# Patient Record
Sex: FEMALE | Race: OTHER | HISPANIC OR LATINO
[De-identification: names, ages, dates, MRNs, and addresses within clinical notes are randomized per-mention and may not be internally consistent; named-entity substitution may affect disease eponyms.]

---

## 2020-07-09 PROBLEM — Z00.00 ENCOUNTER FOR PREVENTIVE HEALTH EXAMINATION: Status: ACTIVE | Noted: 2020-07-09

## 2020-07-13 ENCOUNTER — APPOINTMENT (OUTPATIENT)
Dept: DERMATOLOGY | Facility: CLINIC | Age: 55
End: 2020-07-13
Payer: COMMERCIAL

## 2020-07-13 VITALS — WEIGHT: 200 LBS | HEIGHT: 65 IN | BODY MASS INDEX: 33.32 KG/M2

## 2020-07-13 DIAGNOSIS — Z91.89 OTHER SPECIFIED PERSONAL RISK FACTORS, NOT ELSEWHERE CLASSIFIED: ICD-10-CM

## 2020-07-13 DIAGNOSIS — Z78.9 OTHER SPECIFIED HEALTH STATUS: ICD-10-CM

## 2020-07-13 DIAGNOSIS — B07.8 OTHER VIRAL WARTS: ICD-10-CM

## 2020-07-13 DIAGNOSIS — R20.8 OTHER DISTURBANCES OF SKIN SENSATION: ICD-10-CM

## 2020-07-13 PROCEDURE — 17110 DESTRUCTION B9 LES UP TO 14: CPT

## 2020-07-13 NOTE — PHYSICAL EXAM
[Alert] : alert [Oriented x 3] : ~L oriented x 3 [Well Nourished] : well nourished [FreeTextEntry3] : Type III skin\par \par Patient wearing a facemask\par Dorsum right fifth finger at the medial PIP joint:  10 x 9 mm thick pink verrucous plaque

## 2020-07-13 NOTE — HISTORY OF PRESENT ILLNESS
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  Self treated with "wart remover" without improvement. [FreeTextEntry1] : Wart on right fifth finger

## 2020-08-12 ENCOUNTER — APPOINTMENT (OUTPATIENT)
Dept: DERMATOLOGY | Facility: CLINIC | Age: 55
End: 2020-08-12

## 2020-08-26 ENCOUNTER — LABORATORY RESULT (OUTPATIENT)
Age: 55
End: 2020-08-26

## 2020-08-26 ENCOUNTER — APPOINTMENT (OUTPATIENT)
Dept: RHEUMATOLOGY | Facility: CLINIC | Age: 55
End: 2020-08-26
Payer: COMMERCIAL

## 2020-08-26 DIAGNOSIS — Z78.9 OTHER SPECIFIED HEALTH STATUS: ICD-10-CM

## 2020-08-26 PROCEDURE — 99243 OFF/OP CNSLTJ NEW/EST LOW 30: CPT

## 2020-08-28 PROBLEM — Z78.9 NEVER SMOKED TOBACCO: Status: ACTIVE | Noted: 2020-08-28

## 2020-08-28 NOTE — HISTORY OF PRESENT ILLNESS
[FreeTextEntry1] : 55 year old female with PMH as listed below presents today for an initial evaluation. \par \par Reports to have chronic hx of polyarthralgias, myalgias, fatigue that is now getting progressively worse. Denies swelling to the joints. Pain is 6/10- dull/aching sensation- constant. Worse with activity and at the end of the day. Taking gabapentin 300mg at bedtime, Tylenol/NSAIDs with some improvement in symptoms. \par \par Has hx of chronic LBP and  planned for laminectomy, microdiscectomy this week.\par denies inflammatory back pain,enthesitis, uveitis,dactylitis, psoriasis/rashes, denies fever, chills

## 2020-08-28 NOTE — ASSESSMENT
[FreeTextEntry1] : 55 year old female presents today for an initial evaluation. Reports to have chronic hx of polyarthralgias, myalgias, fatigue that is now getting progressively worse. Denies swelling to the joints. Taking gabapentin 300mg at bedtime, Tylenol/NSAIDs with some improvement in symptoms. Will do further w/o as below. ROS and PE otherwise unremarkable for underlying classic CTD/ systemic autoimmune disease. Symptoms likely from degenerative disease. Likely also has a component of fibromyalgia. \par \par - labs as below\par -encouraged proper diet, good sleep hygiene, exercise, weight loss\par -consider trail with muscle relaxer, +/- duloxetine\par -NSAIDS/Tylenol prn for pain (reviewed risk and benefits of medications)\par -OTC topical analgesics\par \par Discussed treatment plan with the patient. The patient was given the opportunity to ask questions and all questions were answered to their satisfaction.

## 2020-08-28 NOTE — PHYSICAL EXAM
[General Appearance - Alert] : alert [General Appearance - In No Acute Distress] : in no acute distress [General Appearance - Well Nourished] : well nourished [Sclera] : the sclera and conjunctiva were normal [General Appearance - Well Developed] : well developed [PERRL With Normal Accommodation] : pupils were equal in size, round, and reactive to light [Extraocular Movements] : extraocular movements were intact [Examination Of The Oral Cavity] : the lips and gums were normal [Outer Ear] : the ears and nose were normal in appearance [Oropharynx] : the oropharynx was normal [Neck Appearance] : the appearance of the neck was normal [Respiration, Rhythm And Depth] : normal respiratory rhythm and effort [Jugular Venous Distention Increased] : there was no jugular-venous distention [Heart Rate And Rhythm] : heart rate was normal and rhythm regular [Heart Sounds] : normal S1 and S2 [Auscultation Breath Sounds / Voice Sounds] : lungs were clear to auscultation bilaterally [Murmurs] : no murmurs [Bowel Sounds] : normal bowel sounds [Heart Sounds Pericardial Friction Rub] : no pericardial rub [Heart Sounds Gallop] : no gallops [Abdomen Tenderness] : non-tender [Abdomen Soft] : soft [No Spinal Tenderness] : no spinal tenderness [Abnormal Walk] : normal gait [No CVA Tenderness] : no ~M costovertebral angle tenderness [Involuntary Movements] : no involuntary movements were seen [Musculoskeletal - Swelling] : no joint swelling seen [Nail Clubbing] : no clubbing  or cyanosis of the fingernails [Skin Turgor] : normal skin turgor [Skin Color & Pigmentation] : normal skin color and pigmentation [Motor Tone] : muscle strength and tone were normal [] : no rash [Skin Lesions] : no skin lesions [Deep Tendon Reflexes (DTR)] : deep tendon reflexes were 2+ and symmetric [Sensation] : the sensory exam was normal to light touch and pinprick [No Focal Deficits] : no focal deficits [Motor Exam] : the motor exam was normal [Impaired Insight] : insight and judgment were intact [Oriented To Time, Place, And Person] : oriented to person, place, and time [Affect] : the affect was normal [Mood] : the mood was normal [FreeTextEntry1] : + diffuse soft tissue tenderness

## 2020-09-01 LAB
25(OH)D3 SERPL-MCNC: 18 NG/ML
ALBUMIN MFR SERPL ELPH: 59 %
ALBUMIN SERPL ELPH-MCNC: 4.5 G/DL
ALBUMIN SERPL-MCNC: 4.1 G/DL
ALBUMIN/GLOB SERPL: 1.4 RATIO
ALBUPE: 17.6 %
ALDOLASE SERPL-CCNC: 5.4 U/L
ALP BLD-CCNC: 82 U/L
ALPHA1 GLOB MFR SERPL ELPH: 3.7 %
ALPHA1 GLOB SERPL ELPH-MCNC: 0.3 G/DL
ALPHA1UPE: 35.3 %
ALPHA2 GLOB MFR SERPL ELPH: 9.6 %
ALPHA2 GLOB SERPL ELPH-MCNC: 0.7 G/DL
ALPHA2UPE: 21.2 %
ALT SERPL-CCNC: 40 U/L
ANA PAT FLD IF-IMP: ABNORMAL
ANA SER IF-ACNC: ABNORMAL
ANION GAP SERPL CALC-SCNC: 13 MMOL/L
AST SERPL-CCNC: 26 U/L
B-GLOBULIN MFR SERPL ELPH: 12.6 %
B-GLOBULIN SERPL ELPH-MCNC: 0.9 G/DL
BASOPHILS # BLD AUTO: 0.02 K/UL
BASOPHILS NFR BLD AUTO: 0.3 %
BETAUPE: 15.6 %
BILIRUB SERPL-MCNC: 0.4 MG/DL
BUN SERPL-MCNC: 17 MG/DL
C3 SERPL-MCNC: 135 MG/DL
C4 SERPL-MCNC: 32 MG/DL
CALCIUM SERPL-MCNC: 9.7 MG/DL
CCP AB SER IA-ACNC: <8 UNITS
CHLORIDE SERPL-SCNC: 105 MMOL/L
CK SERPL-CCNC: 91 U/L
CO2 SERPL-SCNC: 24 MMOL/L
CREAT 24H UR-MCNC: NORMAL G/24 H
CREAT SERPL-MCNC: 0.66 MG/DL
CREAT SPEC-SCNC: 122 MG/DL
CREAT/PROT UR: 0.1 RATIO
CREATININE UR (MAYO): 121 MG/DL
CRP SERPL-MCNC: 0.25 MG/DL
DSDNA AB SER-ACNC: <12 IU/ML
ENA JO1 AB SER IA-ACNC: <0.2 AL
ENA RNP AB SER IA-ACNC: <0.2 AL
ENA RNP AB SER IA-ACNC: <0.2 AL
ENA SCL70 IGG SER IA-ACNC: <0.2 AL
ENA SM AB SER IA-ACNC: <0.2 AL
ENA SS-A AB SER IA-ACNC: <0.2 AL
ENA SS-B AB SER IA-ACNC: <0.2 AL
EOSINOPHIL # BLD AUTO: 0.16 K/UL
EOSINOPHIL NFR BLD AUTO: 2.6 %
ERYTHROCYTE [SEDIMENTATION RATE] IN BLOOD BY WESTERGREN METHOD: 20 MM/HR
GAMMA GLOB FLD ELPH-MCNC: 1.1 G/DL
GAMMA GLOB MFR SERPL ELPH: 15.1 %
GAMMAUPE: 10.3 %
GLUCOSE SERPL-MCNC: 99 MG/DL
HCT VFR BLD CALC: 44 %
HGB BLD-MCNC: 14 G/DL
IGA 24H UR QL IFE: NORMAL
IMM GRANULOCYTES NFR BLD AUTO: 0.2 %
INTERPRETATION SERPL IEP-IMP: NORMAL
KAPPA LC 24H UR QL: NORMAL
LYMPHOCYTES # BLD AUTO: 2.58 K/UL
LYMPHOCYTES NFR BLD AUTO: 42.6 %
M PROTEIN SPEC IFE-MCNC: NORMAL
MAN DIFF?: NORMAL
MCHC RBC-ENTMCNC: 31.2 PG
MCHC RBC-ENTMCNC: 31.8 GM/DL
MCV RBC AUTO: 98 FL
MONOCYTES # BLD AUTO: 0.5 K/UL
MONOCYTES NFR BLD AUTO: 8.3 %
NEUTROPHILS # BLD AUTO: 2.78 K/UL
NEUTROPHILS NFR BLD AUTO: 46 %
PLATELET # BLD AUTO: 279 K/UL
POTASSIUM SERPL-SCNC: 4.3 MMOL/L
PROT PATTERN 24H UR ELPH-IMP: NORMAL
PROT SERPL-MCNC: 7 G/DL
PROT UR-MCNC: 7 MG/DL
PROT UR-MCNC: 8 MG/DL
PROT UR-MCNC: 8 MG/DL
RBC # BLD: 4.49 M/UL
RBC # FLD: 12.5 %
RF+CCP IGG SER-IMP: NEGATIVE
RHEUMATOID FACT SER QL: <10 IU/ML
SODIUM SERPL-SCNC: 142 MMOL/L
SPECIMEN VOL 24H UR: NORMAL ML
THYROGLOB AB SERPL-ACNC: <20 IU/ML
THYROPEROXIDASE AB SERPL IA-ACNC: 10.6 IU/ML
TSH SERPL-ACNC: 0.64 UIU/ML
WBC # FLD AUTO: 6.05 K/UL

## 2020-09-14 ENCOUNTER — APPOINTMENT (OUTPATIENT)
Dept: RHEUMATOLOGY | Facility: CLINIC | Age: 55
End: 2020-09-14
Payer: COMMERCIAL

## 2020-09-14 DIAGNOSIS — E55.9 VITAMIN D DEFICIENCY, UNSPECIFIED: ICD-10-CM

## 2020-09-14 PROCEDURE — 99213 OFFICE O/P EST LOW 20 MIN: CPT

## 2020-09-15 NOTE — PHYSICAL EXAM
[General Appearance - Alert] : alert [General Appearance - In No Acute Distress] : in no acute distress [General Appearance - Well Nourished] : well nourished [Sclera] : the sclera and conjunctiva were normal [General Appearance - Well Developed] : well developed [Extraocular Movements] : extraocular movements were intact [PERRL With Normal Accommodation] : pupils were equal in size, round, and reactive to light [Outer Ear] : the ears and nose were normal in appearance [Examination Of The Oral Cavity] : the lips and gums were normal [Oropharynx] : the oropharynx was normal [Neck Appearance] : the appearance of the neck was normal [Jugular Venous Distention Increased] : there was no jugular-venous distention [Heart Rate And Rhythm] : heart rate was normal and rhythm regular [Respiration, Rhythm And Depth] : normal respiratory rhythm and effort [Auscultation Breath Sounds / Voice Sounds] : lungs were clear to auscultation bilaterally [Heart Sounds] : normal S1 and S2 [Murmurs] : no murmurs [Heart Sounds Gallop] : no gallops [Heart Sounds Pericardial Friction Rub] : no pericardial rub [Abdomen Tenderness] : non-tender [Abdomen Soft] : soft [Bowel Sounds] : normal bowel sounds [No CVA Tenderness] : no ~M costovertebral angle tenderness [No Spinal Tenderness] : no spinal tenderness [Abnormal Walk] : normal gait [Involuntary Movements] : no involuntary movements were seen [Nail Clubbing] : no clubbing  or cyanosis of the fingernails [Musculoskeletal - Swelling] : no joint swelling seen [Motor Tone] : muscle strength and tone were normal [Skin Color & Pigmentation] : normal skin color and pigmentation [Skin Turgor] : normal skin turgor [] : no rash [Deep Tendon Reflexes (DTR)] : deep tendon reflexes were 2+ and symmetric [Sensation] : the sensory exam was normal to light touch and pinprick [Skin Lesions] : no skin lesions [Motor Exam] : the motor exam was normal [No Focal Deficits] : no focal deficits [Oriented To Time, Place, And Person] : oriented to person, place, and time [Impaired Insight] : insight and judgment were intact [Affect] : the affect was normal [Mood] : the mood was normal [FreeTextEntry1] : + diffuse soft tissue tenderness

## 2020-09-15 NOTE — HISTORY OF PRESENT ILLNESS
[FreeTextEntry1] : Pt presenting today for a f.u visit. Continues to have polyarthralgias, myalgias, fatigue. Denies swelling to the joints. Taking NSAIDS prn with improvement in symptoms. Does not like to take medications on a daily basis. \par Labs reviewed with pt. Has vitamin d insufficiency, otherwise unremarkable.

## 2020-09-15 NOTE — ASSESSMENT
[FreeTextEntry1] : 55 year old female presents today for an f.u visit. Reports to have chronic hx of polyarthralgias, myalgias, fatigue.  Denies swelling to the joints. Taking Tylenol/NSAIDs with some improvement in symptoms. Does not like to take medications on a daily basis. Labs with vitamin d insufficiency, otherwise unremarkable. \par \par Fibromyalgia\par \par -encouraged proper diet, good sleep hygiene, exercise, weight loss\par -consider trail with muscle relaxer, +/- duloxetine\par -NSAIDS/Tylenol prn for pain (reviewed risk and benefits of medications)\par -OTC topical analgesics\par -vitamin d supplementation\par \par Discussed treatment plan with the patient. The patient was given the opportunity to ask questions and all questions were answered to their satisfaction.

## 2020-09-29 ENCOUNTER — FORM ENCOUNTER (OUTPATIENT)
Age: 55
End: 2020-09-29

## 2020-09-29 ENCOUNTER — APPOINTMENT (OUTPATIENT)
Dept: ORTHOPEDIC SURGERY | Facility: CLINIC | Age: 55
End: 2020-09-29
Payer: OTHER MISCELLANEOUS

## 2020-09-29 VITALS — HEART RATE: 71 BPM | DIASTOLIC BLOOD PRESSURE: 69 MMHG | SYSTOLIC BLOOD PRESSURE: 134 MMHG

## 2020-09-29 DIAGNOSIS — M16.12 UNILATERAL PRIMARY OSTEOARTHRITIS, LEFT HIP: ICD-10-CM

## 2020-09-29 PROCEDURE — 73502 X-RAY EXAM HIP UNI 2-3 VIEWS: CPT | Mod: LT

## 2020-09-29 PROCEDURE — 99204 OFFICE O/P NEW MOD 45 MIN: CPT

## 2020-09-29 NOTE — HISTORY OF PRESENT ILLNESS
[de-identified] : Patient is a's 55-year-old female presenting for evaluation of 6-month history left hip pain.  Her left hip pain is localized to the left groin as well as the lateral and posterior aspect of the left hip.  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  She denies any other trauma or falls since this.  Patient feels her pain is worse with weight-bear activities including rising from seated position and walking longer distances.  She also has pain with extremes of motion of the hip.  Patient has not had injections for the hip.  Patient has tried home exercise and therapy with little relief.  She is tried Tylenol and over-the-counter anti-inflammatories with no relief.\par She is status post lumbar discectomy with Dr. Haley.

## 2020-09-29 NOTE — PHYSICAL EXAM
[de-identified] : The patient appears well nourished  and in no apparent distress.  The patient is alert and oriented to person, place, and time.   Affect and mood appear normal. The head is normocephalic and atraumatic.  The eyes reveal normal sclera and extra ocular muscles are intact. The tongue is midline with no apparent lesions.  Skin shows normal turgor with no evidence of eczema or psoriasis.  No respiratory distress noted.  Sensation grossly intact.\par   [de-identified] : Exam of the left hip shows hip external rotation of 25 degrees, internal rotation of 10 degrees with global groin and lateral hip pain. 5/5 motor strength bilaterally distally. Sensation intact distally. Has to use her hands to lift her legs onto the table. [de-identified] : Xray- AP pelvis and 2 views left hip shows severe arthritis of the left hip.

## 2020-09-29 NOTE — REASON FOR VISIT
[Initial Visit] : an initial visit for [Worker's Compensation] : this visit is related to worker's compensation [Other: ____] : [unfilled]

## 2020-09-29 NOTE — DISCUSSION/SUMMARY
[de-identified] : The patient is a 55 year old female with severe arthritis of the left hip. Conservative options were discussed. She was sent for an ultrasound-guided cortisone injection in the left hip.  I think she would benefit from hip replacement however she is still recovering from spine surgery.  Hopefully the injection will buy her some time and once she has recovered from her spine surgery we can revisit hip replacement for her.  I would like to see her back about 3 weeks after the left hip injection.

## 2020-09-29 NOTE — ADDENDUM
[FreeTextEntry1] : This note was authored by Sarwat Garcias working as a medical scribe for Dr. Pablo Muro. The note was reviewed, edited, and revised by Dr. Pablo Muro whom is in agreement with the exam findings, imaging findings, and treatment plan. 09/29/2020.

## 2020-10-08 ENCOUNTER — APPOINTMENT (OUTPATIENT)
Dept: ORTHOPEDIC SURGERY | Facility: CLINIC | Age: 55
End: 2020-10-08

## 2020-12-18 ENCOUNTER — RX RENEWAL (OUTPATIENT)
Age: 55
End: 2020-12-18

## 2021-01-11 ENCOUNTER — APPOINTMENT (OUTPATIENT)
Dept: RHEUMATOLOGY | Facility: CLINIC | Age: 56
End: 2021-01-11
Payer: COMMERCIAL

## 2021-01-11 VITALS
BODY MASS INDEX: 34.49 KG/M2 | WEIGHT: 207 LBS | SYSTOLIC BLOOD PRESSURE: 118 MMHG | DIASTOLIC BLOOD PRESSURE: 78 MMHG | TEMPERATURE: 97.9 F | HEART RATE: 85 BPM | OXYGEN SATURATION: 98 % | HEIGHT: 65 IN | RESPIRATION RATE: 17 BRPM

## 2021-01-11 PROCEDURE — 99072 ADDL SUPL MATRL&STAF TM PHE: CPT

## 2021-01-11 PROCEDURE — 99214 OFFICE O/P EST MOD 30 MIN: CPT

## 2021-01-11 NOTE — HISTORY OF PRESENT ILLNESS
[FreeTextEntry1] : Pt presenting today for a f.u visit. Continues to have intermittent polyarthralgias, myalgias, fatigue. Denies swelling to the joints. Taking gabapentin 300mg BID with improvement in symptoms. Tolerating medication well. Denies side effects.

## 2021-01-11 NOTE — ASSESSMENT
[FreeTextEntry1] : 55 year old female presents today for an f.u visit. Reports to have chronic hx of polyarthralgias, myalgias, fatigue.  Denies swelling to the joints. Taking Tylenol/NSAIDs prn, gabapentin 300mg BID with improvement in symptoms.  Prior labs with vitamin d insufficiency, otherwise unremarkable. \par \par Fibromyalgia\par \par -encouraged proper diet, good sleep hygiene, exercise, weight loss\par -c/w gabapentin 300mg BID\par -consider trail with muscle relaxer, +/- duloxetine\par -NSAIDS/Tylenol prn for pain\par -OTC topical analgesics\par -vitamin d supplementation\par \par Discussed treatment plan with the patient. The patient was given the opportunity to ask questions and all questions were answered to their satisfaction.

## 2021-01-11 NOTE — PHYSICAL EXAM
[General Appearance - Alert] : alert [General Appearance - Well Nourished] : well nourished [General Appearance - In No Acute Distress] : in no acute distress [General Appearance - Well Developed] : well developed [Sclera] : the sclera and conjunctiva were normal [PERRL With Normal Accommodation] : pupils were equal in size, round, and reactive to light [Extraocular Movements] : extraocular movements were intact [Outer Ear] : the ears and nose were normal in appearance [Examination Of The Oral Cavity] : the lips and gums were normal [Oropharynx] : the oropharynx was normal [Neck Appearance] : the appearance of the neck was normal [Jugular Venous Distention Increased] : there was no jugular-venous distention [Respiration, Rhythm And Depth] : normal respiratory rhythm and effort [Auscultation Breath Sounds / Voice Sounds] : lungs were clear to auscultation bilaterally [Heart Rate And Rhythm] : heart rate was normal and rhythm regular [Heart Sounds] : normal S1 and S2 [Heart Sounds Gallop] : no gallops [Murmurs] : no murmurs [Heart Sounds Pericardial Friction Rub] : no pericardial rub [Bowel Sounds] : normal bowel sounds [Abdomen Soft] : soft [Abdomen Tenderness] : non-tender [No CVA Tenderness] : no ~M costovertebral angle tenderness [No Spinal Tenderness] : no spinal tenderness [Abnormal Walk] : normal gait [Nail Clubbing] : no clubbing  or cyanosis of the fingernails [Involuntary Movements] : no involuntary movements were seen [Musculoskeletal - Swelling] : no joint swelling seen [Motor Tone] : muscle strength and tone were normal [Skin Color & Pigmentation] : normal skin color and pigmentation [Skin Turgor] : normal skin turgor [] : no rash [Skin Lesions] : no skin lesions [Deep Tendon Reflexes (DTR)] : deep tendon reflexes were 2+ and symmetric [Sensation] : the sensory exam was normal to light touch and pinprick [Motor Exam] : the motor exam was normal [No Focal Deficits] : no focal deficits [Oriented To Time, Place, And Person] : oriented to person, place, and time [Impaired Insight] : insight and judgment were intact [Affect] : the affect was normal [Mood] : the mood was normal [FreeTextEntry1] : + diffuse soft tissue tenderness

## 2021-03-23 ENCOUNTER — APPOINTMENT (OUTPATIENT)
Dept: RHEUMATOLOGY | Facility: CLINIC | Age: 56
End: 2021-03-23
Payer: COMMERCIAL

## 2021-03-23 VITALS
TEMPERATURE: 97 F | BODY MASS INDEX: 34.49 KG/M2 | DIASTOLIC BLOOD PRESSURE: 86 MMHG | SYSTOLIC BLOOD PRESSURE: 120 MMHG | RESPIRATION RATE: 17 BRPM | WEIGHT: 207 LBS | HEIGHT: 65 IN

## 2021-03-23 DIAGNOSIS — M25.552 PAIN IN LEFT HIP: ICD-10-CM

## 2021-03-23 PROCEDURE — 99072 ADDL SUPL MATRL&STAF TM PHE: CPT

## 2021-03-23 PROCEDURE — 99214 OFFICE O/P EST MOD 30 MIN: CPT

## 2021-03-23 NOTE — PHYSICAL EXAM
[General Appearance - Alert] : alert [General Appearance - In No Acute Distress] : in no acute distress [General Appearance - Well Nourished] : well nourished [General Appearance - Well Developed] : well developed [Sclera] : the sclera and conjunctiva were normal [PERRL With Normal Accommodation] : pupils were equal in size, round, and reactive to light [Extraocular Movements] : extraocular movements were intact [Outer Ear] : the ears and nose were normal in appearance [Examination Of The Oral Cavity] : the lips and gums were normal [Oropharynx] : the oropharynx was normal [Neck Appearance] : the appearance of the neck was normal [Jugular Venous Distention Increased] : there was no jugular-venous distention [Respiration, Rhythm And Depth] : normal respiratory rhythm and effort [Auscultation Breath Sounds / Voice Sounds] : lungs were clear to auscultation bilaterally [Heart Rate And Rhythm] : heart rate was normal and rhythm regular [Heart Sounds] : normal S1 and S2 [Heart Sounds Gallop] : no gallops [Murmurs] : no murmurs [Heart Sounds Pericardial Friction Rub] : no pericardial rub [Bowel Sounds] : normal bowel sounds [Abdomen Soft] : soft [Abdomen Tenderness] : non-tender [No CVA Tenderness] : no ~M costovertebral angle tenderness [No Spinal Tenderness] : no spinal tenderness [Abnormal Walk] : normal gait [Nail Clubbing] : no clubbing  or cyanosis of the fingernails [Involuntary Movements] : no involuntary movements were seen [Musculoskeletal - Swelling] : no joint swelling seen [Motor Tone] : muscle strength and tone were normal [Skin Color & Pigmentation] : normal skin color and pigmentation [Skin Turgor] : normal skin turgor [] : no rash [Skin Lesions] : no skin lesions [Deep Tendon Reflexes (DTR)] : deep tendon reflexes were 2+ and symmetric [Sensation] : the sensory exam was normal to light touch and pinprick [Motor Exam] : the motor exam was normal [No Focal Deficits] : no focal deficits [Oriented To Time, Place, And Person] : oriented to person, place, and time [Impaired Insight] : insight and judgment were intact [Affect] : the affect was normal [Mood] : the mood was normal [FreeTextEntry1] : + diffuse soft tissue tenderness

## 2021-03-23 NOTE — HISTORY OF PRESENT ILLNESS
[FreeTextEntry1] : Pt presenting today for a f.u visit. \par Taking gabapentin 300mg BID with improvement in symptoms. Tolerating medication well. Denies side effects. \par Overall feels better. Continues to have significant fatigue. Denies swelling to the joints. \par Denies fever, chills, CP, SOB, abd pain, rashes.

## 2021-03-23 NOTE — ASSESSMENT
[FreeTextEntry1] : Fibromyalgia\par \par -encouraged proper diet, good sleep hygiene, exercise, weight loss\par -c/w gabapentin 300mg BID\par -start duloxetine 40 mg daily  \par -consider trail with muscle relaxer\par -NSAIDS/Tylenol prn for pain\par -OTC topical analgesics\par -vitamin d supplementation\par \par Discussed treatment plan with the patient. The patient was given the opportunity to ask questions and all questions were answered to their satisfaction.

## 2021-06-18 ENCOUNTER — APPOINTMENT (OUTPATIENT)
Dept: RHEUMATOLOGY | Facility: CLINIC | Age: 56
End: 2021-06-18
Payer: COMMERCIAL

## 2021-06-18 VITALS
HEART RATE: 97 BPM | TEMPERATURE: 97.7 F | HEIGHT: 65 IN | RESPIRATION RATE: 17 BRPM | SYSTOLIC BLOOD PRESSURE: 112 MMHG | OXYGEN SATURATION: 97 % | DIASTOLIC BLOOD PRESSURE: 68 MMHG

## 2021-06-18 PROCEDURE — 99072 ADDL SUPL MATRL&STAF TM PHE: CPT

## 2021-06-18 PROCEDURE — 99213 OFFICE O/P EST LOW 20 MIN: CPT

## 2021-06-18 NOTE — PHYSICAL EXAM
[General Appearance - Alert] : alert [General Appearance - In No Acute Distress] : in no acute distress [General Appearance - Well Developed] : well developed [General Appearance - Well Nourished] : well nourished [Sclera] : the sclera and conjunctiva were normal [PERRL With Normal Accommodation] : pupils were equal in size, round, and reactive to light [Extraocular Movements] : extraocular movements were intact [Outer Ear] : the ears and nose were normal in appearance [Examination Of The Oral Cavity] : the lips and gums were normal [Oropharynx] : the oropharynx was normal [Neck Appearance] : the appearance of the neck was normal [Jugular Venous Distention Increased] : there was no jugular-venous distention [Respiration, Rhythm And Depth] : normal respiratory rhythm and effort [Auscultation Breath Sounds / Voice Sounds] : lungs were clear to auscultation bilaterally [Heart Rate And Rhythm] : heart rate was normal and rhythm regular [Heart Sounds] : normal S1 and S2 [Heart Sounds Gallop] : no gallops [Murmurs] : no murmurs [Heart Sounds Pericardial Friction Rub] : no pericardial rub [Bowel Sounds] : normal bowel sounds [Abdomen Soft] : soft [Abdomen Tenderness] : non-tender [No CVA Tenderness] : no ~M costovertebral angle tenderness [No Spinal Tenderness] : no spinal tenderness [Abnormal Walk] : normal gait [Nail Clubbing] : no clubbing  or cyanosis of the fingernails [Involuntary Movements] : no involuntary movements were seen [Musculoskeletal - Swelling] : no joint swelling seen [Motor Tone] : muscle strength and tone were normal [Skin Color & Pigmentation] : normal skin color and pigmentation [Skin Turgor] : normal skin turgor [] : no rash [Skin Lesions] : no skin lesions [Deep Tendon Reflexes (DTR)] : deep tendon reflexes were 2+ and symmetric [Sensation] : the sensory exam was normal to light touch and pinprick [Motor Exam] : the motor exam was normal [No Focal Deficits] : no focal deficits [Oriented To Time, Place, And Person] : oriented to person, place, and time [Impaired Insight] : insight and judgment were intact [Affect] : the affect was normal [Mood] : the mood was normal [FreeTextEntry1] : + diffuse soft tissue tenderness

## 2021-06-18 NOTE — ASSESSMENT
[FreeTextEntry1] : Fibromyalgia\par \par -repeat labs today\par -encouraged proper diet, good sleep hygiene, exercise, weight loss\par -c/w gabapentin 300mg BID\par -c/w duloxetine 40 mg daily  \par -NSAIDS/Tylenol prn for pain\par -OTC topical analgesics\par -ortho f/u for THR\par \par Discussed treatment plan with the patient. The patient was given the opportunity to ask questions and all questions were answered to their satisfaction.

## 2021-06-18 NOTE — HISTORY OF PRESENT ILLNESS
[FreeTextEntry1] : Pt presenting today for a f.u visit. \par Currently taking gabapentin 300mg BID, duloxetine 40 mg daily with improvement in symptoms. Tolerating medication well. Denies side effects. \par Overall feels better. Planned for THR in July\par Denies fever, chills, CP, SOB, abd pain, rashes.

## 2021-07-09 LAB
ALBUMIN SERPL ELPH-MCNC: 4.3 G/DL
ALP BLD-CCNC: 78 U/L
ALT SERPL-CCNC: 18 U/L
ANION GAP SERPL CALC-SCNC: 13 MMOL/L
AST SERPL-CCNC: 16 U/L
BASOPHILS # BLD AUTO: 0.02 K/UL
BASOPHILS NFR BLD AUTO: 0.4 %
BILIRUB SERPL-MCNC: 0.5 MG/DL
BUN SERPL-MCNC: 18 MG/DL
CALCIUM SERPL-MCNC: 9.4 MG/DL
CHLORIDE SERPL-SCNC: 107 MMOL/L
CK SERPL-CCNC: 107 U/L
CO2 SERPL-SCNC: 22 MMOL/L
CREAT SERPL-MCNC: 0.68 MG/DL
CRP SERPL-MCNC: 3 MG/L
EOSINOPHIL # BLD AUTO: 0.13 K/UL
EOSINOPHIL NFR BLD AUTO: 2.9 %
ERYTHROCYTE [SEDIMENTATION RATE] IN BLOOD BY WESTERGREN METHOD: 14 MM/HR
GLUCOSE SERPL-MCNC: 93 MG/DL
HCT VFR BLD CALC: 40.9 %
HGB BLD-MCNC: 13.5 G/DL
IMM GRANULOCYTES NFR BLD AUTO: 0 %
LYMPHOCYTES # BLD AUTO: 2.07 K/UL
LYMPHOCYTES NFR BLD AUTO: 45.6 %
MAN DIFF?: NORMAL
MCHC RBC-ENTMCNC: 32.1 PG
MCHC RBC-ENTMCNC: 33 GM/DL
MCV RBC AUTO: 97.4 FL
MONOCYTES # BLD AUTO: 0.36 K/UL
MONOCYTES NFR BLD AUTO: 7.9 %
NEUTROPHILS # BLD AUTO: 1.96 K/UL
NEUTROPHILS NFR BLD AUTO: 43.2 %
PLATELET # BLD AUTO: 285 K/UL
POTASSIUM SERPL-SCNC: 3.8 MMOL/L
PROT SERPL-MCNC: 6.6 G/DL
RBC # BLD: 4.2 M/UL
RBC # FLD: 12.2 %
SODIUM SERPL-SCNC: 142 MMOL/L
WBC # FLD AUTO: 4.54 K/UL

## 2021-09-17 ENCOUNTER — APPOINTMENT (OUTPATIENT)
Dept: RHEUMATOLOGY | Facility: CLINIC | Age: 56
End: 2021-09-17
Payer: COMMERCIAL

## 2021-09-17 VITALS
DIASTOLIC BLOOD PRESSURE: 80 MMHG | OXYGEN SATURATION: 98 % | RESPIRATION RATE: 17 BRPM | HEART RATE: 84 BPM | TEMPERATURE: 98.3 F | SYSTOLIC BLOOD PRESSURE: 120 MMHG | HEIGHT: 65 IN

## 2021-09-17 PROCEDURE — 99213 OFFICE O/P EST LOW 20 MIN: CPT

## 2021-09-17 NOTE — HISTORY OF PRESENT ILLNESS
[FreeTextEntry1] : Pt presenting today for a f.u visit. \par Currently taking gabapentin 300mg BID, duloxetine 40 mg daily with improvement in symptoms. Tolerating medication well. Denies side effects. \par Overall feels better. Now s/p Left THR. No complications. Doing PT. \par Denies fever, chills, CP, SOB, abd pain, rashes.

## 2021-09-17 NOTE — ASSESSMENT
[FreeTextEntry1] : Fibromyalgia\par \par -encouraged proper diet, good sleep hygiene, exercise, weight loss\par -c/w gabapentin 300mg BID\par -c/w duloxetine 40 mg daily  \par -OTC topical analgesics\par \par Discussed treatment plan with the patient. The patient was given the opportunity to ask questions and all questions were answered to their satisfaction.

## 2021-09-17 NOTE — PHYSICAL EXAM
[General Appearance - Alert] : alert [General Appearance - In No Acute Distress] : in no acute distress [General Appearance - Well Nourished] : well nourished [General Appearance - Well Developed] : well developed [Sclera] : the sclera and conjunctiva were normal [PERRL With Normal Accommodation] : pupils were equal in size, round, and reactive to light [Extraocular Movements] : extraocular movements were intact [Outer Ear] : the ears and nose were normal in appearance [Oropharynx] : the oropharynx was normal [Examination Of The Oral Cavity] : the lips and gums were normal [Neck Appearance] : the appearance of the neck was normal [Jugular Venous Distention Increased] : there was no jugular-venous distention [Respiration, Rhythm And Depth] : normal respiratory rhythm and effort [Auscultation Breath Sounds / Voice Sounds] : lungs were clear to auscultation bilaterally [Heart Rate And Rhythm] : heart rate was normal and rhythm regular [Heart Sounds] : normal S1 and S2 [Heart Sounds Gallop] : no gallops [Murmurs] : no murmurs [Heart Sounds Pericardial Friction Rub] : no pericardial rub [Bowel Sounds] : normal bowel sounds [Abdomen Soft] : soft [Abdomen Tenderness] : non-tender [No CVA Tenderness] : no ~M costovertebral angle tenderness [No Spinal Tenderness] : no spinal tenderness [Abnormal Walk] : normal gait [Nail Clubbing] : no clubbing  or cyanosis of the fingernails [Involuntary Movements] : no involuntary movements were seen [Musculoskeletal - Swelling] : no joint swelling seen [Motor Tone] : muscle strength and tone were normal [Skin Color & Pigmentation] : normal skin color and pigmentation [Skin Turgor] : normal skin turgor [] : no rash [Skin Lesions] : no skin lesions [Deep Tendon Reflexes (DTR)] : deep tendon reflexes were 2+ and symmetric [Sensation] : the sensory exam was normal to light touch and pinprick [Motor Exam] : the motor exam was normal [No Focal Deficits] : no focal deficits [Oriented To Time, Place, And Person] : oriented to person, place, and time [Impaired Insight] : insight and judgment were intact [Affect] : the affect was normal [Mood] : the mood was normal [FreeTextEntry1] : + diffuse soft tissue tenderness

## 2021-12-17 ENCOUNTER — APPOINTMENT (OUTPATIENT)
Dept: RHEUMATOLOGY | Facility: CLINIC | Age: 56
End: 2021-12-17
Payer: COMMERCIAL

## 2021-12-17 VITALS
RESPIRATION RATE: 17 BRPM | WEIGHT: 205 LBS | OXYGEN SATURATION: 98 % | HEART RATE: 92 BPM | DIASTOLIC BLOOD PRESSURE: 90 MMHG | HEIGHT: 65 IN | TEMPERATURE: 98.2 F | SYSTOLIC BLOOD PRESSURE: 140 MMHG | BODY MASS INDEX: 34.16 KG/M2

## 2021-12-17 PROCEDURE — 99213 OFFICE O/P EST LOW 20 MIN: CPT

## 2021-12-17 RX ORDER — CHROMIUM 200 MCG
25 MCG TABLET ORAL
Qty: 30 | Refills: 1 | Status: DISCONTINUED | COMMUNITY
Start: 2020-10-16 | End: 2021-12-17

## 2021-12-17 NOTE — ASSESSMENT
[FreeTextEntry1] : Fibromyalgia\par \par -encouraged proper diet, good sleep hygiene, exercise, weight loss\par -c/w gabapentin 300mg BID\par -c/w duloxetine 40 mg daily  \par -OTC topical analgesics\par -repeat labs\par \par Discussed treatment plan with the patient. The patient was given the opportunity to ask questions and all questions were answered to their satisfaction.

## 2021-12-17 NOTE — PHYSICAL EXAM
[General Appearance - Alert] : alert [General Appearance - In No Acute Distress] : in no acute distress [General Appearance - Well Nourished] : well nourished [General Appearance - Well Developed] : well developed [Sclera] : the sclera and conjunctiva were normal [PERRL With Normal Accommodation] : pupils were equal in size, round, and reactive to light [Extraocular Movements] : extraocular movements were intact [Outer Ear] : the ears and nose were normal in appearance [Examination Of The Oral Cavity] : the lips and gums were normal [Oropharynx] : the oropharynx was normal [Neck Appearance] : the appearance of the neck was normal [Jugular Venous Distention Increased] : there was no jugular-venous distention [Respiration, Rhythm And Depth] : normal respiratory rhythm and effort [Auscultation Breath Sounds / Voice Sounds] : lungs were clear to auscultation bilaterally [Heart Rate And Rhythm] : heart rate was normal and rhythm regular [Heart Sounds Gallop] : no gallops [Heart Sounds] : normal S1 and S2 [Murmurs] : no murmurs [Heart Sounds Pericardial Friction Rub] : no pericardial rub [Bowel Sounds] : normal bowel sounds [Abdomen Soft] : soft [Abdomen Tenderness] : non-tender [No CVA Tenderness] : no ~M costovertebral angle tenderness [No Spinal Tenderness] : no spinal tenderness [Abnormal Walk] : normal gait [Nail Clubbing] : no clubbing  or cyanosis of the fingernails [Involuntary Movements] : no involuntary movements were seen [Musculoskeletal - Swelling] : no joint swelling seen [Motor Tone] : muscle strength and tone were normal [Skin Color & Pigmentation] : normal skin color and pigmentation [Skin Turgor] : normal skin turgor [Skin Lesions] : no skin lesions [] : no rash [Deep Tendon Reflexes (DTR)] : deep tendon reflexes were 2+ and symmetric [Sensation] : the sensory exam was normal to light touch and pinprick [Motor Exam] : the motor exam was normal [No Focal Deficits] : no focal deficits [Oriented To Time, Place, And Person] : oriented to person, place, and time [Impaired Insight] : insight and judgment were intact [Affect] : the affect was normal [Mood] : the mood was normal [FreeTextEntry1] : + diffuse soft tissue tenderness

## 2021-12-17 NOTE — HISTORY OF PRESENT ILLNESS
[FreeTextEntry1] : Pt presenting today for a f.u visit. No current complaints. \par Currently taking gabapentin 300mg BID, duloxetine 40 mg daily with improvement in symptoms. Tolerating medication well. Denies side effects. \par Denies fever, chills, CP, SOB, abd pain, rashes.

## 2022-05-04 ENCOUNTER — APPOINTMENT (OUTPATIENT)
Dept: ORTHOPEDIC SURGERY | Facility: CLINIC | Age: 57
End: 2022-05-04
Payer: OTHER MISCELLANEOUS

## 2022-05-04 VITALS — BODY MASS INDEX: 32.99 KG/M2 | WEIGHT: 198 LBS | HEIGHT: 65 IN

## 2022-05-04 PROCEDURE — 99204 OFFICE O/P NEW MOD 45 MIN: CPT

## 2022-05-04 PROCEDURE — 99072 ADDL SUPL MATRL&STAF TM PHE: CPT

## 2022-05-04 NOTE — WORK
[Total] : total [Reveals pre-existing condition(s) that may affect treatment/prognosis] : reveals pre-existing condition(s) that may affect treatment/prognosis [Cannot return to work because ________] : cannot return to work because [unfilled] [Rx may affect patient's ability to return to work, make patient drowsy, or other issue] : Rx may affect patient's ability to return to work, make patient drowsy, or other issue. [I provided the services listed above] :  I provided the services listed above. [FreeTextEntry1] : fair [FreeTextEntry2] : back issues

## 2022-05-04 NOTE — PHYSICAL EXAM
[Right] : right foot and ankle [4___] : eversion 4[unfilled]/5 [2+] : dorsalis pedis pulse: 2+ [] : no calf tenderness [FreeTextEntry3] : pinpoint [de-identified] : decreased 4/5 toes

## 2022-05-04 NOTE — REVIEW OF SYSTEMS
[Joint Pain] : joint pain [Joint Swelling] : joint swelling [Negative] : Heme/Lymph [FreeTextEntry2] : weight loss

## 2022-05-04 NOTE — HISTORY OF PRESENT ILLNESS
[Work related] : work related [Sudden] : sudden [5] : 5 [4] : 4 [Ice] : ice [Physical therapy] : physical therapy [Disabled] : Work status: disabled [de-identified] : Patient Complaint - WC 3/3/20 L Ankle and consequential R ankle pain. No prior\par Securing combative patient\par 10/21/20 PT/HEP helpful Not working\par 11/4/20 f/u R ankle Brace helpful, HEP,PT Not working\par 12/16/20202: f/u R ankle. Brace very helpful. Pt/hep. not working\par 2/10/21: f/u R ankle. Brace. hep. Not working\par 3/24/21 f/u R ankle HEP/Brace PT Not working\par 5/5/21: f/u R ankle. brace/hep/PT. Using cane for ambulation. Also having hip issues and waiting to get scheduled for\par THR by Dr. Schumacher Not working\par 6/30/21: f/u R ankle. brace/hep/PT. Using cane for ambulation. Not working. Awaiting scheduling for L THR.\par Ibuprofen/Naprosyn prn.\par 8/11/21: f/u R ankle. She had SHAHID on 7/26/21. Ambulation with cane\par 9/29/21 f/u R ankle PT/brace. Spine eval pending\par 11/10/21: f/u R ankle. Pt/brace. Disability/back issues, considering spine surgery\par 1/5/22: f/u R ankle. mri results. continued pain.\par 2/2/22: f/u R ankle. Pre-Op Peeroneal tendon not walking Pain mgt on Tramadol\par \par 3/16/22: Right Ankle Peroneal Tendon Repair\par \par 3/23/22: f/u R ankle. NWB in splint. Doing ok.\par 5/4/22 f/u R ankle  HEP/PT  had pinpoint drainge  -f/c  PT  Not working/Disability [] : no [FreeTextEntry1] : rt ankle [FreeTextEntry3] : 3-3-20 [FreeTextEntry6] : numbness in toes [de-identified] : worse at night [de-identified] : Dr. Carlos [de-identified] : 3-16-22 [de-identified] : ankle airsport and a cane [de-identified] : 3-16-22 [de-identified] : transfer single tendon/tenodesis

## 2022-05-16 ENCOUNTER — APPOINTMENT (OUTPATIENT)
Dept: RHEUMATOLOGY | Facility: CLINIC | Age: 57
End: 2022-05-16
Payer: COMMERCIAL

## 2022-05-16 VITALS
OXYGEN SATURATION: 96 % | WEIGHT: 200 LBS | DIASTOLIC BLOOD PRESSURE: 86 MMHG | HEART RATE: 83 BPM | TEMPERATURE: 98.1 F | RESPIRATION RATE: 17 BRPM | HEIGHT: 65 IN | SYSTOLIC BLOOD PRESSURE: 134 MMHG | BODY MASS INDEX: 33.32 KG/M2

## 2022-05-16 PROCEDURE — 99214 OFFICE O/P EST MOD 30 MIN: CPT

## 2022-05-16 NOTE — ASSESSMENT
[FreeTextEntry1] : Fibromyalgia\par Diffuse rash- related to epidural? medications? new OTC medication?\par \par Following allergist\par \par - labs as below as pt concerned about having SLE\par - allergy f.u\par - holding current medications- gabapentin 300mg BID, duloxetine 40 mg daily  \par - encouraged proper diet, good sleep hygiene, exercise, weight loss\par \par Discussed treatment plan with the patient. The patient was given the opportunity to ask questions and all questions were answered to their satisfaction.

## 2022-05-16 NOTE — HISTORY OF PRESENT ILLNESS
[FreeTextEntry1] : Pt presenting today for a f.u visit. \par Developed diffuse rash few weeks ago after getting epidural injection. \par Was also taking new OTC fat burning medications?\par Evaluated by allergist- pending labs and further w/o. \par Advised to hold all medications for now. Holding off gabapentin 300mg BID, duloxetine 40 mg daily x 1 week now.  Today with increased joint pain, muscle pain, fatigue. \par Denies fever, chills, CP, SOB, abd pain, oral ulcers, SICCA symptoms, blood in urine. \par Would like to repeat all labs. Has FH of SLE and very concerned about having SLE related rash.

## 2022-06-01 ENCOUNTER — APPOINTMENT (OUTPATIENT)
Dept: RHEUMATOLOGY | Facility: CLINIC | Age: 57
End: 2022-06-01
Payer: COMMERCIAL

## 2022-06-01 VITALS
OXYGEN SATURATION: 98 % | SYSTOLIC BLOOD PRESSURE: 126 MMHG | TEMPERATURE: 98 F | DIASTOLIC BLOOD PRESSURE: 82 MMHG | HEIGHT: 65 IN | HEART RATE: 80 BPM

## 2022-06-01 DIAGNOSIS — R21 RASH AND OTHER NONSPECIFIC SKIN ERUPTION: ICD-10-CM

## 2022-06-01 LAB
ALBUMIN SERPL ELPH-MCNC: 4.2 G/DL
ALP BLD-CCNC: 71 U/L
ALT SERPL-CCNC: 21 U/L
ANA SER IF-ACNC: NEGATIVE
ANION GAP SERPL CALC-SCNC: 15 MMOL/L
APPEARANCE: CLEAR
AST SERPL-CCNC: 19 U/L
BASOPHILS # BLD AUTO: 0.02 K/UL
BASOPHILS NFR BLD AUTO: 0.3 %
BILIRUB SERPL-MCNC: 0.2 MG/DL
BILIRUBIN URINE: NEGATIVE
BLOOD URINE: NEGATIVE
BUN SERPL-MCNC: 15 MG/DL
C3 SERPL-MCNC: 159 MG/DL
C4 SERPL-MCNC: 41 MG/DL
CALCIUM SERPL-MCNC: 9.5 MG/DL
CCP AB SER IA-ACNC: <8 UNITS
CENTROMERE IGG SER-ACNC: <0.2 CD:130001892
CHLORIDE SERPL-SCNC: 105 MMOL/L
CHROMATIN AB SERPL-ACNC: <0.2 AL
CK SERPL-CCNC: 95 U/L
CO2 SERPL-SCNC: 21 MMOL/L
COLOR: COLORLESS
CREAT SERPL-MCNC: 0.74 MG/DL
CREAT SPEC-SCNC: 14 MG/DL
CREAT/PROT UR: NORMAL RATIO
CRP SERPL-MCNC: 5 MG/L
DSDNA AB SER-ACNC: <12 IU/ML
EGFR: 94 ML/MIN/1.73M2
ENA RNP AB SER IA-ACNC: 2.5 AL
ENA RNP AB SER IA-ACNC: 2.5 AL
ENA SM AB SER IA-ACNC: <0.2 AL
ENA SS-A AB SER IA-ACNC: <0.2 AL
ENA SS-B AB SER IA-ACNC: <0.2 AL
EOSINOPHIL # BLD AUTO: 0.18 K/UL
EOSINOPHIL NFR BLD AUTO: 2.9 %
ERYTHROCYTE [SEDIMENTATION RATE] IN BLOOD BY WESTERGREN METHOD: 17 MM/HR
GLUCOSE QUALITATIVE U: NEGATIVE
GLUCOSE SERPL-MCNC: 90 MG/DL
HCT VFR BLD CALC: 38.9 %
HGB BLD-MCNC: 12.8 G/DL
HISTONE AB SER QL: 0.4 UNITS
IMM GRANULOCYTES NFR BLD AUTO: 0.2 %
KETONES URINE: NEGATIVE
LEUKOCYTE ESTERASE URINE: NEGATIVE
LYMPHOCYTES # BLD AUTO: 2.46 K/UL
LYMPHOCYTES NFR BLD AUTO: 39.2 %
MAN DIFF?: NORMAL
MCHC RBC-ENTMCNC: 31.9 PG
MCHC RBC-ENTMCNC: 32.9 GM/DL
MCV RBC AUTO: 97 FL
MONOCYTES # BLD AUTO: 0.46 K/UL
MONOCYTES NFR BLD AUTO: 7.3 %
NEUTROPHILS # BLD AUTO: 3.14 K/UL
NEUTROPHILS NFR BLD AUTO: 50.1 %
NITRITE URINE: NEGATIVE
PH URINE: 6.5
PLATELET # BLD AUTO: 318 K/UL
POTASSIUM SERPL-SCNC: 4.2 MMOL/L
PROT SERPL-MCNC: 6.5 G/DL
PROT UR-MCNC: <4 MG/DL
PROTEIN URINE: NEGATIVE
RBC # BLD: 4.01 M/UL
RBC # FLD: 12.9 %
RF+CCP IGG SER-IMP: NEGATIVE
RHEUMATOID FACT SER QL: 10 IU/ML
SODIUM SERPL-SCNC: 140 MMOL/L
SPECIFIC GRAVITY URINE: 1
THYROGLOB AB SERPL-ACNC: <20 IU/ML
THYROPEROXIDASE AB SERPL IA-ACNC: <10 IU/ML
TSH SERPL-ACNC: 0.81 UIU/ML
UROBILINOGEN URINE: NORMAL
WBC # FLD AUTO: 6.27 K/UL

## 2022-06-01 PROCEDURE — 99214 OFFICE O/P EST MOD 30 MIN: CPT

## 2022-06-01 NOTE — HISTORY OF PRESENT ILLNESS
[FreeTextEntry1] : Pt presenting today for a f.u visit. \par Labs from 05/2022 reviewed with pt- unremarkable. \par Still has rash- she developed diffuse rash few weeks ago after getting epidural injection. \par Evaluated by allergist- pending labs and further w/o. \par Currently holding off on gabapentin 300mg BID, duloxetine 40 mg daily for now. Today with increased joint pain, muscle pain, fatigue. \par Denies fever, chills, CP, SOB, abd pain, oral ulcers, SICCA symptoms, blood in urine.

## 2022-06-01 NOTE — ASSESSMENT
[FreeTextEntry1] : Fibromyalgia\par Diffuse rash- related to epidural? medications? new OTC medication?\par \par Following allergist- pending further work up\par \par - allergy f.u\par - holding current medications- gabapentin 300mg BID, duloxetine 40 mg daily. Resume medications after allergy f.u\par - encouraged proper diet, good sleep hygiene, exercise, weight loss\par \par Discussed treatment plan with the patient. The patient was given the opportunity to ask questions and all questions were answered to their satisfaction.

## 2022-06-01 NOTE — PHYSICAL EXAM
[General Appearance - Alert] : alert [General Appearance - In No Acute Distress] : in no acute distress [General Appearance - Well Nourished] : well nourished [General Appearance - Well Developed] : well developed [Sclera] : the sclera and conjunctiva were normal [PERRL With Normal Accommodation] : pupils were equal in size, round, and reactive to light [Extraocular Movements] : extraocular movements were intact [Outer Ear] : the ears and nose were normal in appearance [Examination Of The Oral Cavity] : the lips and gums were normal [Oropharynx] : the oropharynx was normal [Neck Appearance] : the appearance of the neck was normal [Jugular Venous Distention Increased] : there was no jugular-venous distention [] : no respiratory distress [Respiration, Rhythm And Depth] : normal respiratory rhythm and effort [Auscultation Breath Sounds / Voice Sounds] : lungs were clear to auscultation bilaterally [Heart Rate And Rhythm] : heart rate was normal and rhythm regular [Heart Sounds] : normal S1 and S2 [Heart Sounds Gallop] : no gallops [Murmurs] : no murmurs [Heart Sounds Pericardial Friction Rub] : no pericardial rub [Bowel Sounds] : normal bowel sounds [Abdomen Soft] : soft [Abdomen Tenderness] : non-tender [No Spinal Tenderness] : no spinal tenderness [Abnormal Walk] : normal gait [Nail Clubbing] : no clubbing  or cyanosis of the fingernails [Involuntary Movements] : no involuntary movements were seen [Musculoskeletal - Swelling] : no joint swelling seen [Motor Tone] : muscle strength and tone were normal [No Focal Deficits] : no focal deficits [Oriented To Time, Place, And Person] : oriented to person, place, and time [FreeTextEntry1] : + diffuse soft tissue tenderness

## 2022-06-22 ENCOUNTER — APPOINTMENT (OUTPATIENT)
Dept: ORTHOPEDIC SURGERY | Facility: CLINIC | Age: 57
End: 2022-06-22

## 2022-08-10 ENCOUNTER — APPOINTMENT (OUTPATIENT)
Dept: ORTHOPEDIC SURGERY | Facility: CLINIC | Age: 57
End: 2022-08-10

## 2022-08-10 PROCEDURE — 99213 OFFICE O/P EST LOW 20 MIN: CPT

## 2022-08-10 PROCEDURE — 99072 ADDL SUPL MATRL&STAF TM PHE: CPT

## 2022-08-10 NOTE — PHYSICAL EXAM
[Right] : right foot and ankle [4___] : eversion 4[unfilled]/5 [2+] : dorsalis pedis pulse: 2+ [] : no calf tenderness [de-identified] : decreased 4/5 toes [de-identified] : plantar flexion 30 degrees [TWNoteComboBox7] : dorsiflexion 15 degrees

## 2022-08-10 NOTE — HISTORY OF PRESENT ILLNESS
[Work related] : work related [Sudden] : sudden [5] : 5 [4] : 4 [Ice] : ice [Physical therapy] : physical therapy [Disabled] : Work status: disabled [de-identified] : Patient Complaint - WC 3/3/20 L Ankle and consequential R ankle pain. No prior\par Securing combative patient\par 10/21/20 PT/HEP helpful Not working\par 11/4/20 f/u R ankle Brace helpful, HEP,PT Not working\par 12/16/20202: f/u R ankle. Brace very helpful. Pt/hep. not working\par 2/10/21: f/u R ankle. Brace. hep. Not working\par 3/24/21 f/u R ankle HEP/Brace PT Not working\par 5/5/21: f/u R ankle. brace/hep/PT. Using cane for ambulation. Also having hip issues and waiting to get scheduled for\par THR by Dr. Schumacher Not working\par 6/30/21: f/u R ankle. brace/hep/PT. Using cane for ambulation. Not working. Awaiting scheduling for L THR.\par Ibuprofen/Naprosyn prn.\par 8/11/21: f/u R ankle. She had SHAHID on 7/26/21. Ambulation with cane\par 9/29/21 f/u R ankle PT/brace. Spine eval pending\par 11/10/21: f/u R ankle. Pt/brace. Disability/back issues, considering spine surgery\par 1/5/22: f/u R ankle. mri results. continued pain.\par 2/2/22: f/u R ankle. Pre-Op Peeroneal tendon not walking Pain mgt on Tramadol\par \par 3/16/22: Right Ankle Peroneal Tendon Repair\par \par 3/23/22: f/u R ankle. NWB in splint. Doing ok.\par 5/4/22 f/u R ankle  HEP/PT  had pinpoint drainge  -f/c  PT  Not working/Disability\par 8/10/22: f/u R ankle. Pt/hep. Numbness lateral foot [] : no [FreeTextEntry1] : rt ankle [FreeTextEntry3] : 3-3-20 [FreeTextEntry5] : HEP,  nsaids and pain mgmt gave pain meds, c/o of swelling, numbness. [FreeTextEntry6] : numbness in toes [de-identified] : worse at night [de-identified] : Dr. Carlos [de-identified] : 3-16-22 [de-identified] : ankle airsport and a cane [de-identified] : 3-16-22 [de-identified] : transfer single tendon/tenodesis

## 2022-09-06 ENCOUNTER — APPOINTMENT (OUTPATIENT)
Dept: RHEUMATOLOGY | Facility: CLINIC | Age: 57
End: 2022-09-06

## 2022-09-06 VITALS
SYSTOLIC BLOOD PRESSURE: 120 MMHG | RESPIRATION RATE: 17 BRPM | HEIGHT: 65 IN | WEIGHT: 206 LBS | OXYGEN SATURATION: 97 % | TEMPERATURE: 98 F | HEART RATE: 82 BPM | BODY MASS INDEX: 34.32 KG/M2 | DIASTOLIC BLOOD PRESSURE: 80 MMHG

## 2022-09-06 PROCEDURE — 99214 OFFICE O/P EST MOD 30 MIN: CPT

## 2022-09-06 RX ORDER — ATORVASTATIN CALCIUM 20 MG/1
20 TABLET, FILM COATED ORAL
Refills: 0 | Status: DISCONTINUED | COMMUNITY
End: 2022-09-06

## 2022-09-06 RX ORDER — CHOLECALCIFEROL (VITAMIN D3) 50 MCG
25 MCG TABLET ORAL
Qty: 30 | Refills: 1 | Status: DISCONTINUED | COMMUNITY
Start: 2020-12-18 | End: 2022-09-06

## 2022-09-06 NOTE — ASSESSMENT
[FreeTextEntry1] : Fibromyalgia\par \par - c/w gabapentin 300mg BID, duloxetine 40 mg daily\par - encouraged proper diet, good sleep hygiene, exercise, weight loss\par \par Discussed treatment plan with the patient. The patient was given the opportunity to ask questions and all questions were answered to their satisfaction.

## 2022-09-06 NOTE — HISTORY OF PRESENT ILLNESS
[FreeTextEntry1] : Pt presenting today for a f.u visit. \par Restarted her medications. \par Evaluated by allergist with w/o unremarkable. Stopped OTC "fat loosing medications" and reports rash has since then resolved. \par Currently on gabapentin 300mg BID, duloxetine 40 mg daily for now. Tolerating medications well. Denies side effects. Reports overall improvement in symptoms. \par Denies fever, chills, CP, SOB, abd pain, oral ulcers, SICCA symptoms, blood in urine.

## 2022-09-28 ENCOUNTER — OUTPATIENT (OUTPATIENT)
Dept: OUTPATIENT SERVICES | Facility: HOSPITAL | Age: 57
LOS: 1 days | End: 2022-09-28
Payer: COMMERCIAL

## 2022-09-28 VITALS
DIASTOLIC BLOOD PRESSURE: 70 MMHG | TEMPERATURE: 98 F | OXYGEN SATURATION: 98 % | HEIGHT: 65 IN | HEART RATE: 105 BPM | SYSTOLIC BLOOD PRESSURE: 110 MMHG | WEIGHT: 191.8 LBS | RESPIRATION RATE: 18 BRPM

## 2022-09-28 DIAGNOSIS — M54.9 DORSALGIA, UNSPECIFIED: ICD-10-CM

## 2022-09-28 DIAGNOSIS — Z98.890 OTHER SPECIFIED POSTPROCEDURAL STATES: Chronic | ICD-10-CM

## 2022-09-28 DIAGNOSIS — Z29.9 ENCOUNTER FOR PROPHYLACTIC MEASURES, UNSPECIFIED: ICD-10-CM

## 2022-09-28 DIAGNOSIS — Z01.818 ENCOUNTER FOR OTHER PREPROCEDURAL EXAMINATION: ICD-10-CM

## 2022-09-28 DIAGNOSIS — Z98.891 HISTORY OF UTERINE SCAR FROM PREVIOUS SURGERY: Chronic | ICD-10-CM

## 2022-09-28 DIAGNOSIS — Z90.49 ACQUIRED ABSENCE OF OTHER SPECIFIED PARTS OF DIGESTIVE TRACT: Chronic | ICD-10-CM

## 2022-09-28 DIAGNOSIS — Z96.642 PRESENCE OF LEFT ARTIFICIAL HIP JOINT: Chronic | ICD-10-CM

## 2022-09-28 DIAGNOSIS — M54.16 RADICULOPATHY, LUMBAR REGION: ICD-10-CM

## 2022-09-28 DIAGNOSIS — Z90.711 ACQUIRED ABSENCE OF UTERUS WITH REMAINING CERVICAL STUMP: Chronic | ICD-10-CM

## 2022-09-28 DIAGNOSIS — M54.50 LOW BACK PAIN, UNSPECIFIED: ICD-10-CM

## 2022-09-28 LAB
A1C WITH ESTIMATED AVERAGE GLUCOSE RESULT: 5.4 % — SIGNIFICANT CHANGE UP (ref 4–5.6)
ALBUMIN SERPL ELPH-MCNC: 4.7 G/DL — SIGNIFICANT CHANGE UP (ref 3.3–5.2)
ALP SERPL-CCNC: 91 U/L — SIGNIFICANT CHANGE UP (ref 40–120)
ALT FLD-CCNC: 31 U/L — SIGNIFICANT CHANGE UP
ANION GAP SERPL CALC-SCNC: 13 MMOL/L — SIGNIFICANT CHANGE UP (ref 5–17)
APPEARANCE UR: ABNORMAL
APTT BLD: 30.3 SEC — SIGNIFICANT CHANGE UP (ref 27.5–35.5)
AST SERPL-CCNC: 28 U/L — SIGNIFICANT CHANGE UP
BASOPHILS # BLD AUTO: 0.02 K/UL — SIGNIFICANT CHANGE UP (ref 0–0.2)
BASOPHILS NFR BLD AUTO: 0.3 % — SIGNIFICANT CHANGE UP (ref 0–2)
BILIRUB SERPL-MCNC: 0.8 MG/DL — SIGNIFICANT CHANGE UP (ref 0.4–2)
BILIRUB UR-MCNC: NEGATIVE — SIGNIFICANT CHANGE UP
BLD GP AB SCN SERPL QL: SIGNIFICANT CHANGE UP
BUN SERPL-MCNC: 26.5 MG/DL — HIGH (ref 8–20)
CALCIUM SERPL-MCNC: 9.9 MG/DL — SIGNIFICANT CHANGE UP (ref 8.4–10.5)
CHLORIDE SERPL-SCNC: 97 MMOL/L — LOW (ref 98–107)
CO2 SERPL-SCNC: 27 MMOL/L — SIGNIFICANT CHANGE UP (ref 22–29)
COD CRY URNS QL: ABNORMAL
COLOR SPEC: YELLOW — SIGNIFICANT CHANGE UP
CREAT SERPL-MCNC: 0.74 MG/DL — SIGNIFICANT CHANGE UP (ref 0.5–1.3)
DIFF PNL FLD: ABNORMAL
EGFR: 94 ML/MIN/1.73M2 — SIGNIFICANT CHANGE UP
EOSINOPHIL # BLD AUTO: 0.07 K/UL — SIGNIFICANT CHANGE UP (ref 0–0.5)
EOSINOPHIL NFR BLD AUTO: 0.9 % — SIGNIFICANT CHANGE UP (ref 0–6)
EPI CELLS # UR: SIGNIFICANT CHANGE UP
ESTIMATED AVERAGE GLUCOSE: 108 MG/DL — SIGNIFICANT CHANGE UP (ref 68–114)
GLUCOSE SERPL-MCNC: 135 MG/DL — HIGH (ref 70–99)
GLUCOSE UR QL: NEGATIVE MG/DL — SIGNIFICANT CHANGE UP
HCT VFR BLD CALC: 44.4 % — SIGNIFICANT CHANGE UP (ref 34.5–45)
HGB BLD-MCNC: 15.2 G/DL — SIGNIFICANT CHANGE UP (ref 11.5–15.5)
IMM GRANULOCYTES NFR BLD AUTO: 0.3 % — SIGNIFICANT CHANGE UP (ref 0–0.9)
INR BLD: 1.03 RATIO — SIGNIFICANT CHANGE UP (ref 0.88–1.16)
KETONES UR-MCNC: ABNORMAL
LEUKOCYTE ESTERASE UR-ACNC: ABNORMAL
LYMPHOCYTES # BLD AUTO: 2.98 K/UL — SIGNIFICANT CHANGE UP (ref 1–3.3)
LYMPHOCYTES # BLD AUTO: 40.3 % — SIGNIFICANT CHANGE UP (ref 13–44)
MCHC RBC-ENTMCNC: 31.9 PG — SIGNIFICANT CHANGE UP (ref 27–34)
MCHC RBC-ENTMCNC: 34.2 GM/DL — SIGNIFICANT CHANGE UP (ref 32–36)
MCV RBC AUTO: 93.1 FL — SIGNIFICANT CHANGE UP (ref 80–100)
MONOCYTES # BLD AUTO: 0.54 K/UL — SIGNIFICANT CHANGE UP (ref 0–0.9)
MONOCYTES NFR BLD AUTO: 7.3 % — SIGNIFICANT CHANGE UP (ref 2–14)
NEUTROPHILS # BLD AUTO: 3.77 K/UL — SIGNIFICANT CHANGE UP (ref 1.8–7.4)
NEUTROPHILS NFR BLD AUTO: 50.9 % — SIGNIFICANT CHANGE UP (ref 43–77)
NITRITE UR-MCNC: NEGATIVE — SIGNIFICANT CHANGE UP
PH UR: 5 — SIGNIFICANT CHANGE UP (ref 5–8)
PLATELET # BLD AUTO: 345 K/UL — SIGNIFICANT CHANGE UP (ref 150–400)
POTASSIUM SERPL-MCNC: 3.9 MMOL/L — SIGNIFICANT CHANGE UP (ref 3.5–5.3)
POTASSIUM SERPL-SCNC: 3.9 MMOL/L — SIGNIFICANT CHANGE UP (ref 3.5–5.3)
PROT SERPL-MCNC: 7.8 G/DL — SIGNIFICANT CHANGE UP (ref 6.6–8.7)
PROT UR-MCNC: NEGATIVE — SIGNIFICANT CHANGE UP
PROTHROM AB SERPL-ACNC: 12 SEC — SIGNIFICANT CHANGE UP (ref 10.5–13.4)
RBC # BLD: 4.77 M/UL — SIGNIFICANT CHANGE UP (ref 3.8–5.2)
RBC # FLD: 12.5 % — SIGNIFICANT CHANGE UP (ref 10.3–14.5)
RBC CASTS # UR COMP ASSIST: SIGNIFICANT CHANGE UP /HPF (ref 0–4)
SODIUM SERPL-SCNC: 137 MMOL/L — SIGNIFICANT CHANGE UP (ref 135–145)
SP GR SPEC: 1.02 — SIGNIFICANT CHANGE UP (ref 1.01–1.02)
UROBILINOGEN FLD QL: NEGATIVE MG/DL — SIGNIFICANT CHANGE UP
WBC # BLD: 7.4 K/UL — SIGNIFICANT CHANGE UP (ref 3.8–10.5)
WBC # FLD AUTO: 7.4 K/UL — SIGNIFICANT CHANGE UP (ref 3.8–10.5)
WBC UR QL: SIGNIFICANT CHANGE UP /HPF (ref 0–5)

## 2022-09-28 PROCEDURE — 93010 ELECTROCARDIOGRAM REPORT: CPT

## 2022-09-28 PROCEDURE — 93005 ELECTROCARDIOGRAM TRACING: CPT

## 2022-09-28 PROCEDURE — G0463: CPT

## 2022-09-28 RX ORDER — GABAPENTIN 400 MG/1
1 CAPSULE ORAL
Qty: 0 | Refills: 0 | DISCHARGE

## 2022-09-28 NOTE — H&P PST ADULT - GASTROINTESTINAL
normal/soft/nontender/nondistended/normal active bowel sounds negative soft/nontender/nondistended/normal active bowel sounds/no guarding/no masses palpable

## 2022-09-28 NOTE — H&P PST ADULT - HISTORY OF PRESENT ILLNESS
58 yo F PMH of polyarthralgias, myalgias, fatigue, presents with c/o chronic low back pain. Pain is 6/10, constant, dull and aching, worse with activity and at the end of the day. She is taking gabapentin 300 mg at bedtime, Tylenol/NSAIDs with some improvement in symptoms. Preop assessment prior to laminectomy, microdiscectomy w/Dr Haley scheduled for 10/7/2022   56 yo F PMH of polyarthralgias, myalgias, fatigue, presents with c/o chronic low back pain. Pain is 6/10, constant, dull and aching, worse with activity and at the end of the day. She is taking gabapentin 300 mg at bedtime, Tylenol/NSAIDs with some improvement in symptoms. Preop assessment prior to L4-S1 laminectomy, left sided transforaminal lumbar w/Dr Haley scheduled for 10/7/2022   56 yo F PMH of HLD, polyarthralgias, myalgias, fatigue, presents with c/o chronic low back pain. Pain is 6/10, constant, dull and aching, worse with activity and at the end of the day. She is taking gabapentin 300 mg at bedtime, Tylenol/NSAIDs with some improvement in symptoms. Preop assessment prior to L4-S1 laminectomy, left sided transforaminal lumbar w/Dr Haley scheduled for 10/7/2022   58 yo F PMH of HLD, XIOMARA on CPAP, presents with c/o chronic low back pain caused by work injury on 3/3/2020. Patient describes her pain as 6/10, constant, dull and aching, radiating down her left leg, worse with activity and at the end of the day. She is taking gabapentin 300 mg at bedtime, tramadol 50 mg TID as needed with some improvement in symptoms. She ambulates with a cane. Preop assessment prior to L4-S1 laminectomy, left sided transforaminal lumbar w/Dr Haley scheduled for 10/7/2022

## 2022-09-28 NOTE — H&P PST ADULT - NSICDXPASTSURGICALHX_GEN_ALL_CORE_FT
PAST SURGICAL HISTORY:  H/O  section x3    H/O lumbar discectomy 2020    History of ankle surgery right ankle, 3/2022    History of carpal tunnel surgery of left wrist     History of carpal tunnel surgery of right wrist 2021    History of cholecystectomy 1987    History of left hip replacement 2021    S/P partial hysterectomy      PAST SURGICAL HISTORY:  H/O  section x3    H/O lumbar discectomy 2020    History of ankle surgery right ankle, 3/2022    History of carpal tunnel surgery of left wrist     History of carpal tunnel surgery of right wrist 2021    History of cholecystectomy     History of colonoscopy     History of left hip replacement 2021    S/P partial hysterectomy

## 2022-09-28 NOTE — H&P PST ADULT - NEUROLOGICAL
normal/cranial nerves II-XII intact/sensation intact details… normal/cranial nerves II-XII intact/sensation intact/responds to verbal commands/no spontaneous movement

## 2022-09-28 NOTE — H&P PST ADULT - ASSESSMENT
58 yo F PMH of polyarthralgias, myalgias, fatigue, presents with c/o chronic low back pain. Pain is 6/10, constant, dull and aching, worse with activity and at the end of the day. She is taking gabapentin 300 mg at bedtime, Tylenol/NSAIDs with some improvement in symptoms. Preop assessment prior to laminectomy, microdiscectomy w/Dr Haley scheduled for 10/7/2022    Spine booklet provided, ERP protocol reviewed, MSSA/MRSA swab done in pst, results pending.     Pt was educated on preop preparation with written and verbal instructions. Pt was informed to obtain clearance >3 days before surgery. Pt will review medications with PCP. Pt was educated on NSAIDs, multivitamins and herbals that increase the risk of bleeding and need to be stopped 7 days before procedure. Pt was educated on covid testing and covid prevention, i.e. social distancing, handwashing, mask wearing. Pt verbalized understanding of the above.     OPIOID RISK TOOL    AGUILA EACH BOX THAT APPLIES AND ADD TOTALS AT THE END    FAMILY HISTORY OF SUBSTANCE ABUSE                 FEMALE         MALE                                                Alcohol                             [  ]1 pt          [  ]3pts                                               Illegal Durgs                     [  ]2 pts        [  ]3pts                                               Rx Drugs                           [  ]4 pts        [  ]4 pts    PERSONAL HISTORY OF SUBSTANCE ABUSE                                                                                          Alcohol                             [  ]3 pts       [  ]3 pts                                               Illegal Drugs                     [  ]4 pts        [  ]4 pts                                               Rx Drugs                           [  ]5 pts        [  ]5 pts    AGE BETWEEN 16-45 YEARS                                      [  ]1 pt         [  ]1 pt    HISTORY OF PREADOLESCENT   SEXUAL ABUSE                                                             [  ]3 pts        [  ]0pts    PSYCHOLOGICAL DISEASE                     ADD, OCD, Bipolar, Schizophrenia        [  ]2 pts         [  ]2 pts                      Depression                                               [  ]1 pt           [  ]1 pt           SCORING TOTAL   (add numbers and type here)              ( 0 )                                     A score of 3 or lower indicated LOW risk for future opioid abuse  A score of 4 to 7 indicated moderate risk for future opioid abuse  A score of 8 or higher indicates a high risk for opioid abuse    CAPRINI VTE 2.0 SCORE [CLOT updated 2019]    AGE RELATED RISK FACTORS                                                       MOBILITY RELATED FACTORS  [x ] Age 41-60 years                                            (1 Point)                    [ ] Bed rest                                                        (1 Point)  [ ] Age: 61-74 years                                           (2 Points)                  [ ] Plaster cast                                                   (2 Points)  [ ] Age= 75 years                                              (3 Points)                    [ ] Bed bound for more than 72 hours                 (2 Points)    DISEASE RELATED RISK FACTORS                                               GENDER SPECIFIC FACTORS  [ ] Edema in the lower extremities                       (1 Point)              [ ] Pregnancy                                                     (1 Point)  [ ] Varicose veins                                               (1 Point)                     [ ] Post-partum < 6 weeks                                   (1 Point)             [ x] BMI > 25 Kg/m2                                            (1 Point)                     [ ] Hormonal therapy  or oral contraception          (1 Point)                 [ ] Sepsis (in the previous month)                        (1 Point)               [ ] History of pregnancy complications                 (1 point)  [ ] Pneumonia or serious lung disease                                               [ ] Unexplained or recurrent                     (1 Point)           (in the previous month)                               (1 Point)  [ ] Abnormal pulmonary function test                     (1 Point)                 SURGERY RELATED RISK FACTORS  [ ] Acute myocardial infarction                              (1 Point)               [ ]  Section                                             (1 Point)  [ ] Congestive heart failure (in the previous month)  (1 Point)      [ ] Minor surgery                                                  (1 Point)   [ ] Inflammatory bowel disease                             (1 Point)               [ ] Arthroscopic surgery                                        (2 Points)  [ ] Central venous access                                      (2 Points)                [x ] General surgery lasting more than 45 minutes (2 points)  [ ] Malignancy- Present or previous                   (2 Points)                [ ] Elective arthroplasty                                         (5 points)    [ ] Stroke (in the previous month)                          (5 Points)                                                                                                                                                           HEMATOLOGY RELATED FACTORS                                                 TRAUMA RELATED RISK FACTORS  [ ] Prior episodes of VTE                                     (3 Points)                [ ] Fracture of the hip, pelvis, or leg                       (5 Points)  [ ] Positive family history for VTE                         (3 Points)             [ ] Acute spinal cord injury (in the previous month)  (5 Points)  [ ] Prothrombin 65014 A                                     (3 Points)               [ ] Paralysis  (less than 1 month)                             (5 Points)  [ ] Factor V Leiden                                             (3 Points)                  [ ] Multiple Trauma within 1 month                        (5 Points)  [ ] Lupus anticoagulants                                     (3 Points)                                                           [ ] Anticardiolipin antibodies                               (3 Points)                                                       [ ] High homocysteine in the blood                      (3 Points)                                             [ ] Other congenital or acquired thrombophilia      (3 Points)                                                [ ] Heparin induced thrombocytopenia                  (3 Points)                                     Total Score [     4     ] 56 yo F PMH of polyarthralgias, myalgias, fatigue, presents with c/o chronic low back pain. Pain is 6/10, constant, dull and aching, worse with activity and at the end of the day. She is taking gabapentin 300 mg at bedtime, Tylenol/NSAIDs with some improvement in symptoms. Preop assessment prior to L4-S1 laminectomy, left sided transforaminal lumbar w/Dr Haley scheduled for 10/7/2022    Spine booklet provided, ERP protocol reviewed, MSSA/MRSA swab done in pst, results pending.     Pt was educated on preop preparation with written and verbal instructions. Pt was informed to obtain clearance >3 days before surgery. Pt will review medications with PCP. Pt was educated on NSAIDs, multivitamins and herbals that increase the risk of bleeding and need to be stopped 7 days before procedure. Pt was educated on covid testing and covid prevention, i.e. social distancing, handwashing, mask wearing. Pt verbalized understanding of the above.     OPIOID RISK TOOL    AGUILA EACH BOX THAT APPLIES AND ADD TOTALS AT THE END    FAMILY HISTORY OF SUBSTANCE ABUSE                 FEMALE         MALE                                                Alcohol                             [  ]1 pt          [  ]3pts                                               Illegal Durgs                     [  ]2 pts        [  ]3pts                                               Rx Drugs                           [  ]4 pts        [  ]4 pts    PERSONAL HISTORY OF SUBSTANCE ABUSE                                                                                          Alcohol                             [  ]3 pts       [  ]3 pts                                               Illegal Drugs                     [  ]4 pts        [  ]4 pts                                               Rx Drugs                           [  ]5 pts        [  ]5 pts    AGE BETWEEN 16-45 YEARS                                      [  ]1 pt         [  ]1 pt    HISTORY OF PREADOLESCENT   SEXUAL ABUSE                                                             [  ]3 pts        [  ]0pts    PSYCHOLOGICAL DISEASE                     ADD, OCD, Bipolar, Schizophrenia        [  ]2 pts         [  ]2 pts                      Depression                                               [  ]1 pt           [  ]1 pt           SCORING TOTAL   (add numbers and type here)              ( 0 )                                     A score of 3 or lower indicated LOW risk for future opioid abuse  A score of 4 to 7 indicated moderate risk for future opioid abuse  A score of 8 or higher indicates a high risk for opioid abuse    CAPRINI VTE 2.0 SCORE [CLOT updated 2019]    AGE RELATED RISK FACTORS                                                       MOBILITY RELATED FACTORS  [x ] Age 41-60 years                                            (1 Point)                    [ ] Bed rest                                                        (1 Point)  [ ] Age: 61-74 years                                           (2 Points)                  [ ] Plaster cast                                                   (2 Points)  [ ] Age= 75 years                                              (3 Points)                    [ ] Bed bound for more than 72 hours                 (2 Points)    DISEASE RELATED RISK FACTORS                                               GENDER SPECIFIC FACTORS  [ ] Edema in the lower extremities                       (1 Point)              [ ] Pregnancy                                                     (1 Point)  [ ] Varicose veins                                               (1 Point)                     [ ] Post-partum < 6 weeks                                   (1 Point)             [ x] BMI > 25 Kg/m2                                            (1 Point)                     [ ] Hormonal therapy  or oral contraception          (1 Point)                 [ ] Sepsis (in the previous month)                        (1 Point)               [ ] History of pregnancy complications                 (1 point)  [ ] Pneumonia or serious lung disease                                               [ ] Unexplained or recurrent                     (1 Point)           (in the previous month)                               (1 Point)  [ ] Abnormal pulmonary function test                     (1 Point)                 SURGERY RELATED RISK FACTORS  [ ] Acute myocardial infarction                              (1 Point)               [ ]  Section                                             (1 Point)  [ ] Congestive heart failure (in the previous month)  (1 Point)      [ ] Minor surgery                                                  (1 Point)   [ ] Inflammatory bowel disease                             (1 Point)               [ ] Arthroscopic surgery                                        (2 Points)  [ ] Central venous access                                      (2 Points)                [x ] General surgery lasting more than 45 minutes (2 points)  [ ] Malignancy- Present or previous                   (2 Points)                [ ] Elective arthroplasty                                         (5 points)    [ ] Stroke (in the previous month)                          (5 Points)                                                                                                                                                           HEMATOLOGY RELATED FACTORS                                                 TRAUMA RELATED RISK FACTORS  [ ] Prior episodes of VTE                                     (3 Points)                [ ] Fracture of the hip, pelvis, or leg                       (5 Points)  [ ] Positive family history for VTE                         (3 Points)             [ ] Acute spinal cord injury (in the previous month)  (5 Points)  [ ] Prothrombin 52139 A                                     (3 Points)               [ ] Paralysis  (less than 1 month)                             (5 Points)  [ ] Factor V Leiden                                             (3 Points)                  [ ] Multiple Trauma within 1 month                        (5 Points)  [ ] Lupus anticoagulants                                     (3 Points)                                                           [ ] Anticardiolipin antibodies                               (3 Points)                                                       [ ] High homocysteine in the blood                      (3 Points)                                             [ ] Other congenital or acquired thrombophilia      (3 Points)                                                [ ] Heparin induced thrombocytopenia                  (3 Points)                                     Total Score [     4     ] 56 yo F PMH of HLD, XIOMARA on CPAP, presents with c/o chronic low back pain caused by work injury on 3/3/2020. Patient describes her pain as 6/10, constant, dull and aching, radiating down her left leg, worse with activity and at the end of the day. She is taking gabapentin 300 mg at bedtime, tramadol 50 mg TID as needed with some improvement in symptoms. She ambulates with a cane. Preop assessment prior to L4-S1 laminectomy, left sided transforaminal lumbar w/Dr Haley scheduled for 10/7/2022    Spine booklet provided, ERP protocol reviewed, MSSA/MRSA swab done in pst, results pending.     Pt was educated on preop preparation with written and verbal instructions. Pt was informed to obtain clearance >3 days before surgery. Pt will review medications with PCP. Pt was educated on NSAIDs, multivitamins and herbals that increase the risk of bleeding and need to be stopped 7 days before procedure. Pt was educated on covid testing and covid prevention, i.e. social distancing, handwashing, mask wearing. Pt verbalized understanding of the above.     OPIOID RISK TOOL    AGUILA EACH BOX THAT APPLIES AND ADD TOTALS AT THE END    FAMILY HISTORY OF SUBSTANCE ABUSE                 FEMALE         MALE                                                Alcohol                             [  ]1 pt          [  ]3pts                                               Illegal Durgs                     [  ]2 pts        [  ]3pts                                               Rx Drugs                           [  ]4 pts        [  ]4 pts    PERSONAL HISTORY OF SUBSTANCE ABUSE                                                                                          Alcohol                             [  ]3 pts       [  ]3 pts                                               Illegal Drugs                     [  ]4 pts        [  ]4 pts                                               Rx Drugs                           [  ]5 pts        [  ]5 pts    AGE BETWEEN 16-45 YEARS                                      [  ]1 pt         [  ]1 pt    HISTORY OF PREADOLESCENT   SEXUAL ABUSE                                                             [  ]3 pts        [  ]0pts    PSYCHOLOGICAL DISEASE                     ADD, OCD, Bipolar, Schizophrenia        [  ]2 pts         [  ]2 pts                      Depression                                               [  ]1 pt           [  ]1 pt           SCORING TOTAL   (add numbers and type here)              ( 0 )                                     A score of 3 or lower indicated LOW risk for future opioid abuse  A score of 4 to 7 indicated moderate risk for future opioid abuse  A score of 8 or higher indicates a high risk for opioid abuse    CAPRINI VTE 2.0 SCORE [CLOT updated 2019]    AGE RELATED RISK FACTORS                                                       MOBILITY RELATED FACTORS  [x ] Age 41-60 years                                            (1 Point)                    [ ] Bed rest                                                        (1 Point)  [ ] Age: 61-74 years                                           (2 Points)                  [ ] Plaster cast                                                   (2 Points)  [ ] Age= 75 years                                              (3 Points)                    [ ] Bed bound for more than 72 hours                 (2 Points)    DISEASE RELATED RISK FACTORS                                               GENDER SPECIFIC FACTORS  [ ] Edema in the lower extremities                       (1 Point)              [ ] Pregnancy                                                     (1 Point)  [ ] Varicose veins                                               (1 Point)                     [ ] Post-partum < 6 weeks                                   (1 Point)             [ x] BMI > 25 Kg/m2                                            (1 Point)                     [ ] Hormonal therapy  or oral contraception          (1 Point)                 [ ] Sepsis (in the previous month)                        (1 Point)               [ ] History of pregnancy complications                 (1 point)  [ ] Pneumonia or serious lung disease                                               [ ] Unexplained or recurrent                     (1 Point)           (in the previous month)                               (1 Point)  [ ] Abnormal pulmonary function test                     (1 Point)                 SURGERY RELATED RISK FACTORS  [ ] Acute myocardial infarction                              (1 Point)               [ ]  Section                                             (1 Point)  [ ] Congestive heart failure (in the previous month)  (1 Point)      [ ] Minor surgery                                                  (1 Point)   [ ] Inflammatory bowel disease                             (1 Point)               [ ] Arthroscopic surgery                                        (2 Points)  [ ] Central venous access                                      (2 Points)                [x ] General surgery lasting more than 45 minutes (2 points)  [ ] Malignancy- Present or previous                   (2 Points)                [ ] Elective arthroplasty                                         (5 points)    [ ] Stroke (in the previous month)                          (5 Points)                                                                                                                                                           HEMATOLOGY RELATED FACTORS                                                 TRAUMA RELATED RISK FACTORS  [ ] Prior episodes of VTE                                     (3 Points)                [ ] Fracture of the hip, pelvis, or leg                       (5 Points)  [ ] Positive family history for VTE                         (3 Points)             [ ] Acute spinal cord injury (in the previous month)  (5 Points)  [ ] Prothrombin 32507 A                                     (3 Points)               [ ] Paralysis  (less than 1 month)                             (5 Points)  [ ] Factor V Leiden                                             (3 Points)                  [ ] Multiple Trauma within 1 month                        (5 Points)  [ ] Lupus anticoagulants                                     (3 Points)                                                           [ ] Anticardiolipin antibodies                               (3 Points)                                                       [ ] High homocysteine in the blood                      (3 Points)                                             [ ] Other congenital or acquired thrombophilia      (3 Points)                                                [ ] Heparin induced thrombocytopenia                  (3 Points)                                     Total Score [     4     ]

## 2022-09-28 NOTE — H&P PST ADULT - BIRTH SEX
"DAILY PROGRESS NOTE  [facility]    Patient Identification:  Name: Ze Wu  Age: 74 y.o.  Sex: male  :  1942  MRN: [id number]         Primary Care Physician: No Known Provider    Subjective:  DOING SAME  NO NEW COMPLAINTS    Objective:    Scheduled Meds:    aspirin 81 mg Oral Daily   epoetin bacilio 10,000 Units Intravenous Once per day on  Sat   isosorbide mononitrate 30 mg Oral Q24H   lansoprazole 30 mg Oral QAM   levothyroxine 100 mcg Oral Daily   Vancomycin Pharmacy Intermittent Dosing  Does not apply Daily   warfarin 1 mg Oral Daily     Continuous Infusions:    Pharmacy to dose vancomycin        Vital signs in last 24 hours:  Temp:  [96.9 °F (36.1 °C)-98.4 °F (36.9 °C)] 97.4 °F (36.3 °C)  Heart Rate:  [72-89] 78  Resp:  [16-18] 16  BP: ()/(42-62) 110/45    Intake/Output:    Intake/Output Summary (Last 24 hours) at 17 1303  Last data filed at 17 1814   Gross per 24 hour   Intake    450 ml   Output      0 ml   Net    450 ml       Exam:  Visit Vitals   • /45 (BP Location: Left arm, Patient Position: Lying)   • Pulse 78   • Temp 97.4 °F (36.3 °C) (Oral)   • Resp 16   • Ht 74\" (188 cm)   • Wt 165 lb 5.5 oz (75 kg)   • SpO2 95%   • BMI 21.23 kg/m2       General Appearance:    Alert, cooperative, no distress, AAOx3                          Head:    Normocephalic, without obvious abnormality, atraumatic                           Eyes:    PERRL, conjunctiva/corneas clear, EOM's intact, both eyes                         Throat:   Lips, tongue, gums normal; oral mucosa pink and moist                           Neck:   Supple, symmetrical, trachea midline, no JVD                         Lungs:    Clear to auscultation bilaterally, respirations unlabored                 Chest Wall:    No tenderness or deformity                          Heart:    Regular rate and rhythm, S1 and S2 normal, no murmur,no  Rub                                      or gallop                  " Abdomen:     Soft, non-tender, bowel sounds active, no masses, no                                                        organomegaly                  Extremities:   Extremities normal, atraumatic, no cyanosis or edema                        Pulses:   Pulses palpable in all extremities                            Skin:   Skin is warm and dry,  no rashes or palpable lesions                  Neurologic:   CNII-XII intact, motor strength grossly intact, sensation grossly                                         intact to light touch, no focal deficits noted       Data Review:  No results found for: WBC, HGB, HCT, PLT  No results found for: NA, K, CL, CO2, BUN, CREATININE, GLUCOSE  No results found for: CALCIUM, MG, PHOS  No results found for: AST, ALT, ALKPHOS  Lab Results   Component Value Date    INR 2.89 (H) 02/12/2017       Assessment:  Active Problems:    Sepsis  esrd on hd  Atrial fibrillation permanent  Hypertension  mrsa bacteremia  BRADYCARDIA NO PPM FOR NOW SEC TO INFECTION      Plan:  CPM  CHANGE XANX TO VALIUM  D/C  RESTRAINS  IV ABX  AC  NO PPM UNTIL ABX COMPLETED  HDC TIW  PT/OT  SUPPORTIVE CARE  JESSICA ONCE BED AVAILABLE    Rc No MD  2/12/2017  1:03 PM     Female

## 2022-09-28 NOTE — H&P PST ADULT - NEGATIVE NEUROLOGICAL SYMPTOMS
no transient paralysis/no generalized seizures/no focal seizures/no syncope/no tremors/no vertigo/no loss of consciousness/no confusion

## 2022-09-28 NOTE — H&P PST ADULT - PROBLEM SELECTOR PLAN 3
preop assessment, medical clearance pending, L4-S1 laminectomy, left sided transforaminal lumbar w/Dr Haley scheduled for 10/7/2022

## 2022-09-28 NOTE — H&P PST ADULT - PROBLEM SELECTOR PLAN 2
preop assessment, medical clearance pending, laminectomy, microdiscectomy w/Dr Haley scheduled for 10/7/2022 preop assessment, medical clearance pending, L4-S1 laminectomy, left sided transforaminal lumbar w/Dr Haley scheduled for 10/7/2022

## 2022-09-28 NOTE — H&P PST ADULT - NSICDXPASTMEDICALHX_GEN_ALL_CORE_FT
PAST MEDICAL HISTORY:  Back pain     H/O fatigue     Myalgia     Polyarthralgia      PAST MEDICAL HISTORY:  H/O fatigue     Lower back pain     Lumbar radiculopathy     Myalgia     Polyarthralgia      PAST MEDICAL HISTORY:  Hyperlipidemia     Lower back pain     Lumbar radiculopathy     XIOMARA on CPAP

## 2022-09-28 NOTE — H&P PST ADULT - MUSCULOSKELETAL
ROM intact/no calf tenderness/normal gait/strength 5/5 bilateral upper extremities/strength 5/5 bilateral lower extremities details… no calf tenderness/decreased ROM/decreased ROM due to pain/strength 5/5 bilateral upper extremities/strength 5/5 bilateral lower extremities/abnormal gait

## 2022-09-28 NOTE — H&P PST ADULT - CARDIOVASCULAR
normal/regular rate and rhythm/S1 S2 present/no gallops/no rub/no murmur details… normal/regular rate and rhythm/S1 S2 present/no gallops/no rub/no murmur/no JVD/no pedal edema

## 2022-09-28 NOTE — H&P PST ADULT - MUSCULOSKELETAL COMMENTS
walks with antalgic gait on left side, ambulates with a cane; wears right ankle support brace s/p surgery in March 2022

## 2022-09-28 NOTE — H&P PST ADULT - NSICDXFAMILYHX_GEN_ALL_CORE_FT
FAMILY HISTORY:  Father  Still living? Unknown  FH: diabetes mellitus, Age at diagnosis: Age Unknown    Mother  Still living? Unknown  Family history of cervical cancer, Age at diagnosis: Age Unknown  FH: diabetes mellitus, Age at diagnosis: Age Unknown    Grandparent  Still living? Unknown  FH: heart attack, Age at diagnosis: Age Unknown

## 2022-09-29 ENCOUNTER — RESULT REVIEW (OUTPATIENT)
Age: 57
End: 2022-09-29

## 2022-09-29 ENCOUNTER — OUTPATIENT (OUTPATIENT)
Dept: OUTPATIENT SERVICES | Facility: HOSPITAL | Age: 57
LOS: 1 days | End: 2022-09-29
Payer: COMMERCIAL

## 2022-09-29 DIAGNOSIS — Z98.890 OTHER SPECIFIED POSTPROCEDURAL STATES: Chronic | ICD-10-CM

## 2022-09-29 DIAGNOSIS — Z90.711 ACQUIRED ABSENCE OF UTERUS WITH REMAINING CERVICAL STUMP: Chronic | ICD-10-CM

## 2022-09-29 DIAGNOSIS — Z96.642 PRESENCE OF LEFT ARTIFICIAL HIP JOINT: Chronic | ICD-10-CM

## 2022-09-29 DIAGNOSIS — Z90.49 ACQUIRED ABSENCE OF OTHER SPECIFIED PARTS OF DIGESTIVE TRACT: Chronic | ICD-10-CM

## 2022-09-29 DIAGNOSIS — Z01.818 ENCOUNTER FOR OTHER PREPROCEDURAL EXAMINATION: ICD-10-CM

## 2022-09-29 DIAGNOSIS — Z98.891 HISTORY OF UTERINE SCAR FROM PREVIOUS SURGERY: Chronic | ICD-10-CM

## 2022-09-29 LAB
MRSA PCR RESULT.: SIGNIFICANT CHANGE UP
S AUREUS DNA NOSE QL NAA+PROBE: SIGNIFICANT CHANGE UP

## 2022-09-29 PROCEDURE — 71046 X-RAY EXAM CHEST 2 VIEWS: CPT

## 2022-09-29 PROCEDURE — 71046 X-RAY EXAM CHEST 2 VIEWS: CPT | Mod: 26

## 2022-09-30 RX ORDER — ACETAMINOPHEN 500 MG
975 TABLET ORAL ONCE
Refills: 0 | Status: DISCONTINUED | OUTPATIENT
Start: 2022-10-07 | End: 2022-10-11

## 2022-10-06 ENCOUNTER — TRANSCRIPTION ENCOUNTER (OUTPATIENT)
Age: 57
End: 2022-10-06

## 2022-10-06 NOTE — PATIENT PROFILE ADULT - FALL HARM RISK - HARM RISK INTERVENTIONS
Assistance with ambulation/Assistance OOB with selected safe patient handling equipment/Communicate Risk of Fall with Harm to all staff/Discuss with provider need for PT consult/Monitor gait and stability/Provide patient with walking aids - walker, cane, crutches/Reinforce activity limits and safety measures with patient and family/Sit up slowly, dangle for a short time, stand at bedside before walking/Tailored Fall Risk Interventions/Use of alarms - bed, chair and/or voice tab/Visual Cue: Yellow wristband and red socks/Bed in lowest position, wheels locked, appropriate side rails in place/Call bell, personal items and telephone in reach/Instruct patient to call for assistance before getting out of bed or chair/Non-slip footwear when patient is out of bed/[place] to call system/Physically safe environment - no spills, clutter or unnecessary equipment/Purposeful Proactive Rounding/Room/bathroom lighting operational, light cord in reach

## 2022-10-07 ENCOUNTER — TRANSCRIPTION ENCOUNTER (OUTPATIENT)
Age: 57
End: 2022-10-07

## 2022-10-07 ENCOUNTER — RESULT REVIEW (OUTPATIENT)
Age: 57
End: 2022-10-07

## 2022-10-07 ENCOUNTER — INPATIENT (INPATIENT)
Facility: HOSPITAL | Age: 57
LOS: 3 days | Discharge: HOME | DRG: 455 | End: 2022-10-11
Attending: ORTHOPAEDIC SURGERY | Admitting: ORTHOPAEDIC SURGERY
Payer: COMMERCIAL

## 2022-10-07 VITALS
DIASTOLIC BLOOD PRESSURE: 56 MMHG | TEMPERATURE: 97 F | RESPIRATION RATE: 18 BRPM | WEIGHT: 191.8 LBS | HEIGHT: 65 IN | HEART RATE: 67 BPM | SYSTOLIC BLOOD PRESSURE: 119 MMHG | OXYGEN SATURATION: 100 %

## 2022-10-07 DIAGNOSIS — Z98.890 OTHER SPECIFIED POSTPROCEDURAL STATES: Chronic | ICD-10-CM

## 2022-10-07 DIAGNOSIS — M54.50 LOW BACK PAIN, UNSPECIFIED: ICD-10-CM

## 2022-10-07 DIAGNOSIS — Z96.642 PRESENCE OF LEFT ARTIFICIAL HIP JOINT: Chronic | ICD-10-CM

## 2022-10-07 DIAGNOSIS — Z98.891 HISTORY OF UTERINE SCAR FROM PREVIOUS SURGERY: Chronic | ICD-10-CM

## 2022-10-07 DIAGNOSIS — Z90.711 ACQUIRED ABSENCE OF UTERUS WITH REMAINING CERVICAL STUMP: Chronic | ICD-10-CM

## 2022-10-07 DIAGNOSIS — Z90.49 ACQUIRED ABSENCE OF OTHER SPECIFIED PARTS OF DIGESTIVE TRACT: Chronic | ICD-10-CM

## 2022-10-07 LAB
ABO RH CONFIRMATION: SIGNIFICANT CHANGE UP
ANION GAP SERPL CALC-SCNC: 15 MMOL/L — SIGNIFICANT CHANGE UP (ref 5–17)
BUN SERPL-MCNC: 13 MG/DL — SIGNIFICANT CHANGE UP (ref 8–20)
CALCIUM SERPL-MCNC: 9.1 MG/DL — SIGNIFICANT CHANGE UP (ref 8.4–10.5)
CHLORIDE SERPL-SCNC: 104 MMOL/L — SIGNIFICANT CHANGE UP (ref 98–107)
CO2 SERPL-SCNC: 18 MMOL/L — LOW (ref 22–29)
CREAT SERPL-MCNC: 0.71 MG/DL — SIGNIFICANT CHANGE UP (ref 0.5–1.3)
EGFR: 98 ML/MIN/1.73M2 — SIGNIFICANT CHANGE UP
GLUCOSE BLDC GLUCOMTR-MCNC: 125 MG/DL — HIGH (ref 70–99)
GLUCOSE BLDC GLUCOMTR-MCNC: 91 MG/DL — SIGNIFICANT CHANGE UP (ref 70–99)
GLUCOSE SERPL-MCNC: 131 MG/DL — HIGH (ref 70–99)
HCT VFR BLD CALC: 36.3 % — SIGNIFICANT CHANGE UP (ref 34.5–45)
HGB BLD-MCNC: 12.1 G/DL — SIGNIFICANT CHANGE UP (ref 11.5–15.5)
MCHC RBC-ENTMCNC: 31.8 PG — SIGNIFICANT CHANGE UP (ref 27–34)
MCHC RBC-ENTMCNC: 33.3 GM/DL — SIGNIFICANT CHANGE UP (ref 32–36)
MCV RBC AUTO: 95.5 FL — SIGNIFICANT CHANGE UP (ref 80–100)
PLATELET # BLD AUTO: 219 K/UL — SIGNIFICANT CHANGE UP (ref 150–400)
POTASSIUM SERPL-MCNC: 4.1 MMOL/L — SIGNIFICANT CHANGE UP (ref 3.5–5.3)
POTASSIUM SERPL-SCNC: 4.1 MMOL/L — SIGNIFICANT CHANGE UP (ref 3.5–5.3)
RBC # BLD: 3.8 M/UL — SIGNIFICANT CHANGE UP (ref 3.8–5.2)
RBC # FLD: 12.3 % — SIGNIFICANT CHANGE UP (ref 10.3–14.5)
SODIUM SERPL-SCNC: 137 MMOL/L — SIGNIFICANT CHANGE UP (ref 135–145)
WBC # BLD: 9.79 K/UL — SIGNIFICANT CHANGE UP (ref 3.8–10.5)
WBC # FLD AUTO: 9.79 K/UL — SIGNIFICANT CHANGE UP (ref 3.8–10.5)

## 2022-10-07 PROCEDURE — 88304 TISSUE EXAM BY PATHOLOGIST: CPT | Mod: 26

## 2022-10-07 PROCEDURE — 99222 1ST HOSP IP/OBS MODERATE 55: CPT

## 2022-10-07 DEVICE — ROD TI 60MM: Type: IMPLANTABLE DEVICE | Status: FUNCTIONAL

## 2022-10-07 DEVICE — OSTEOSELECT DBM PLUS 5CC: Type: IMPLANTABLE DEVICE | Status: FUNCTIONAL

## 2022-10-07 DEVICE — CHIP CANCELLOUS 1-8MM 15CC: Type: IMPLANTABLE DEVICE | Status: FUNCTIONAL

## 2022-10-07 DEVICE — SCREW SET: Type: IMPLANTABLE DEVICE | Status: FUNCTIONAL

## 2022-10-07 DEVICE — GRAFT BONE INFUSE KIT SM: Type: IMPLANTABLE DEVICE | Status: FUNCTIONAL

## 2022-10-07 DEVICE — SCREW MAS 6.5X45MM: Type: IMPLANTABLE DEVICE | Status: FUNCTIONAL

## 2022-10-07 DEVICE — IMPLANTABLE DEVICE: Type: IMPLANTABLE DEVICE | Status: FUNCTIONAL

## 2022-10-07 DEVICE — SEALANT FLOSEAL FAST PREP HEMOSTATIC MATRIX 10ML: Type: IMPLANTABLE DEVICE | Status: FUNCTIONAL

## 2022-10-07 DEVICE — SPONGE GELFOAM SZ 100 UNCOMPRESSED: Type: IMPLANTABLE DEVICE | Status: FUNCTIONAL

## 2022-10-07 DEVICE — SURGIFOAM PAD SZ 100: Type: IMPLANTABLE DEVICE | Status: FUNCTIONAL

## 2022-10-07 RX ORDER — CEFAZOLIN SODIUM 1 G
2000 VIAL (EA) INJECTION
Refills: 0 | Status: COMPLETED | OUTPATIENT
Start: 2022-10-07 | End: 2022-10-08

## 2022-10-07 RX ORDER — ACETAMINOPHEN 500 MG
1000 TABLET ORAL ONCE
Refills: 0 | Status: DISCONTINUED | OUTPATIENT
Start: 2022-10-07 | End: 2022-10-11

## 2022-10-07 RX ORDER — ACETAMINOPHEN 500 MG
975 TABLET ORAL EVERY 8 HOURS
Refills: 0 | Status: DISCONTINUED | OUTPATIENT
Start: 2022-10-07 | End: 2022-10-11

## 2022-10-07 RX ORDER — PHENTERMINE HCL 30 MG
1 CAPSULE ORAL
Qty: 0 | Refills: 0 | DISCHARGE

## 2022-10-07 RX ORDER — ATORVASTATIN CALCIUM 80 MG/1
10 TABLET, FILM COATED ORAL AT BEDTIME
Refills: 0 | Status: DISCONTINUED | OUTPATIENT
Start: 2022-10-07 | End: 2022-10-11

## 2022-10-07 RX ORDER — METHOCARBAMOL 500 MG/1
750 TABLET, FILM COATED ORAL THREE TIMES A DAY
Refills: 0 | Status: DISCONTINUED | OUTPATIENT
Start: 2022-10-07 | End: 2022-10-11

## 2022-10-07 RX ORDER — SODIUM CHLORIDE 9 MG/ML
3 INJECTION INTRAMUSCULAR; INTRAVENOUS; SUBCUTANEOUS EVERY 8 HOURS
Refills: 0 | Status: DISCONTINUED | OUTPATIENT
Start: 2022-10-07 | End: 2022-10-07

## 2022-10-07 RX ORDER — ACETAMINOPHEN 500 MG
1000 TABLET ORAL ONCE
Refills: 0 | Status: COMPLETED | OUTPATIENT
Start: 2022-10-07 | End: 2022-10-07

## 2022-10-07 RX ORDER — SODIUM CHLORIDE 9 MG/ML
1000 INJECTION, SOLUTION INTRAVENOUS
Refills: 0 | Status: DISCONTINUED | OUTPATIENT
Start: 2022-10-07 | End: 2022-10-11

## 2022-10-07 RX ORDER — SODIUM CHLORIDE 9 MG/ML
1000 INJECTION, SOLUTION INTRAVENOUS
Refills: 0 | Status: DISCONTINUED | OUTPATIENT
Start: 2022-10-07 | End: 2022-10-07

## 2022-10-07 RX ORDER — DIPHENHYDRAMINE HCL 50 MG
25 CAPSULE ORAL EVERY 4 HOURS
Refills: 0 | Status: DISCONTINUED | OUTPATIENT
Start: 2022-10-07 | End: 2022-10-11

## 2022-10-07 RX ORDER — APREPITANT 80 MG/1
40 CAPSULE ORAL ONCE
Refills: 0 | Status: COMPLETED | OUTPATIENT
Start: 2022-10-07 | End: 2022-10-07

## 2022-10-07 RX ORDER — ERGOCALCIFEROL 1.25 MG/1
1 CAPSULE ORAL
Qty: 0 | Refills: 0 | DISCHARGE

## 2022-10-07 RX ORDER — ONDANSETRON 8 MG/1
4 TABLET, FILM COATED ORAL ONCE
Refills: 0 | Status: DISCONTINUED | OUTPATIENT
Start: 2022-10-07 | End: 2022-10-07

## 2022-10-07 RX ORDER — CEFAZOLIN SODIUM 1 G
2000 VIAL (EA) INJECTION ONCE
Refills: 0 | Status: DISCONTINUED | OUTPATIENT
Start: 2022-10-07 | End: 2022-10-07

## 2022-10-07 RX ORDER — GABAPENTIN 400 MG/1
300 CAPSULE ORAL AT BEDTIME
Refills: 0 | Status: DISCONTINUED | OUTPATIENT
Start: 2022-10-07 | End: 2022-10-10

## 2022-10-07 RX ORDER — CELECOXIB 200 MG/1
400 CAPSULE ORAL ONCE
Refills: 0 | Status: COMPLETED | OUTPATIENT
Start: 2022-10-07 | End: 2022-10-07

## 2022-10-07 RX ORDER — HYDROMORPHONE HYDROCHLORIDE 2 MG/ML
0.5 INJECTION INTRAMUSCULAR; INTRAVENOUS; SUBCUTANEOUS
Refills: 0 | Status: DISCONTINUED | OUTPATIENT
Start: 2022-10-07 | End: 2022-10-07

## 2022-10-07 RX ADMIN — GABAPENTIN 300 MILLIGRAM(S): 400 CAPSULE ORAL at 21:18

## 2022-10-07 RX ADMIN — Medication 100 MILLIGRAM(S): at 21:19

## 2022-10-07 RX ADMIN — Medication 975 MILLIGRAM(S): at 21:18

## 2022-10-07 RX ADMIN — Medication 400 MILLIGRAM(S): at 15:45

## 2022-10-07 RX ADMIN — CELECOXIB 400 MILLIGRAM(S): 200 CAPSULE ORAL at 06:48

## 2022-10-07 RX ADMIN — Medication 25 MILLIGRAM(S): at 21:18

## 2022-10-07 RX ADMIN — Medication 975 MILLIGRAM(S): at 21:30

## 2022-10-07 RX ADMIN — APREPITANT 40 MILLIGRAM(S): 80 CAPSULE ORAL at 06:48

## 2022-10-07 RX ADMIN — SODIUM CHLORIDE 125 MILLILITER(S): 9 INJECTION, SOLUTION INTRAVENOUS at 21:19

## 2022-10-07 RX ADMIN — ATORVASTATIN CALCIUM 10 MILLIGRAM(S): 80 TABLET, FILM COATED ORAL at 21:18

## 2022-10-07 NOTE — DISCHARGE NOTE PROVIDER - HOSPITAL COURSE
Patient was admitted for elective posterior lumbar TLIF on 10/7/22, post operative course was uneventful.  PT/OT worked with patient for acute rehabilitation process.  The patient was given antibiotics per protocol.  The patient was started on SCD's for DVTP. Pain is now adequately controlled and patient is stable for discharge.

## 2022-10-07 NOTE — DISCHARGE NOTE PROVIDER - NSDCFUADDINST_GEN_ALL_CORE_FT
Please make an appointment for follow up with your surgeon in 1-2 weeks. Call to schedule an appointment. Staples or stitches will be removed at your follow up appointment. Keep incision site clean and dry. No creams, lotions, or ointments to incision area. You are cleared to shower 3 days after surgery unless otherwise instructed.    Take pain medication as prescribed after surgery for pain control. Contact your surgeon's office if pain increases while taking prescribed pain medications or if related concerns develop. If you are taking narcotic pain medications, take a stool softener with it to prevent narcotic associated constipation. Additionally, increase water intake (drink at least 8 glasses daily) and add fiber to your diet by eating fruits, vegetables and foods that are rich in grains.     Do NOT take NSAIDs, including ibuprofen, Advil, Aleve, and Motrin for at least 3 months after your surgery as these medications can impact your healing.    NO heavy lifting, strenuous activity, twisting, bending, driving, or working until cleared by your surgeon  Contact your surgeon's office immediately for any of the following: fever, bleeding, new onset numbness/tingling/weakness, nausea and/or vomiting, urinary and/or fecal incontinence or retention. If an emergency occurs (chest pain, shortness of breath, new confusion, seizure, altered mental status, or other), call 911 and go to the emergency department for evaluation.   Please make an appointment for follow up with your surgeon in 1-2 weeks. Call to schedule an appointment. Staples or stitches will be removed at your follow up appointment. Keep incision site clean and dry. No creams, lotions, or ointments to incision area. You are cleared to shower 3 days after surgery unless otherwise instructed, protect while showering.    Take pain medication as prescribed after surgery for pain control. Contact your surgeon's office if pain increases while taking prescribed pain medications or if related concerns develop. If you are taking narcotic pain medications, take a stool softener with it to prevent narcotic associated constipation. Additionally, increase water intake (drink at least 8 glasses daily) and add fiber to your diet by eating fruits, vegetables and foods that are rich in grains.     Do NOT take NSAIDs, including ibuprofen, Advil, Aleve, and Motrin for at least 3 months after your surgery as these medications can impact your healing.    NO heavy lifting, strenuous activity, twisting, bending, driving, or working until cleared by your surgeon  Contact your surgeon's office immediately for any of the following: fever, bleeding, new onset numbness/tingling/weakness, nausea and/or vomiting, urinary and/or fecal incontinence or retention. If an emergency occurs (chest pain, shortness of breath, new confusion, seizure, altered mental status, or other), call 911 and go to the emergency department for evaluation.

## 2022-10-07 NOTE — PROGRESS NOTE ADULT - SUBJECTIVE AND OBJECTIVE BOX
Called by nurse to evaluate patient with facial swelling. Nurse reports patient noted her own facial swelling in the recovery room, did not notify nurse that she felt this way. Upon pt arrival to the floor, patient expresses to night nurse that her facial swelling is resolving but remains present. Medical team called to evaluate - Dr Araujo at bedside during examination. No difficulty breathing or swallowing. Patient denies any itchiness or rashes. Residual swelling noted to face. Patient prone during surgery for lengthy procedure. Benadryl ordered and nursing staff will continue to monitor. Continue IV antibiotics as prescribed. Lumbar dressing c/d/i, MCKINLEY drain noted with good suction, +parasthesias to bilateral feet (baseline neuropathy as per pt), motor 5/5 bilaterally without any deficits noted. Patient has no other complaints.

## 2022-10-07 NOTE — PROGRESS NOTE ADULT - SUBJECTIVE AND OBJECTIVE BOX
Orthopedic PA Postop Note  Patient S/P L4-S1 TLIF  Patient in bed comfortable    strength 5/5 to all extremities   sensation intact    A/P S/P TLIF L4-S1  1. DVTP - SCD's only   2. PT   3. Pain Control

## 2022-10-07 NOTE — BRIEF OPERATIVE NOTE - NSICDXBRIEFPREOP_GEN_ALL_CORE_FT
PRE-OP DIAGNOSIS:  Spondylolisthesis, lumbar region 07-Oct-2022 14:12:10  Cl Haley  Lumbar disc herniation with radiculopathy 07-Oct-2022 14:12:28  Cl Haley

## 2022-10-07 NOTE — PHYSICAL THERAPY INITIAL EVALUATION ADULT - ADDITIONAL COMMENTS
Pt reports living with nephew in a house with 6+6 JARVIS c rail. Pt amb with SAC and is independent with functional mobility, ADLs, and IADLs. Pt drives and is on disability. Pt has support of family. Pt owns SAC (no RW).

## 2022-10-07 NOTE — H&P PST ADULT - ATTENDING COMMENTS
Patient's H&P from the office has been reviewed.  Patient seen and examined.  No significant changes in patient's history or exam.    Patient's MRI from outside facility reviewed confirming operative levels.    Cl Haley MD

## 2022-10-07 NOTE — DISCHARGE NOTE PROVIDER - NSDCFUSCHEDAPPT_GEN_ALL_CORE_FT
Aamir Carlos Physician [facility]  [address] [facility]  Scheduled Appointment: 11/02/2022     Aamir Carlos  [facility]  [address] [facility]  Scheduled Appointment: 11/02/2022    [facility]  [address] [facility]  Scheduled Appointment: 01/09/2023

## 2022-10-07 NOTE — DISCHARGE NOTE PROVIDER - NSDCCPTREATMENT_GEN_ALL_CORE_FT
PRINCIPAL PROCEDURE  Procedure: Fusion, spine, lumbar, TLIF, 1-2 levels  Findings and Treatment:

## 2022-10-07 NOTE — DISCHARGE NOTE PROVIDER - CARE PROVIDER_API CALL
Cl Haley)  Orthopaedic Surgery  [address] [place] [address]. [address], [place] [address]  Phone: [phone]  Fax: [phone]  Follow Up Time:

## 2022-10-07 NOTE — BRIEF OPERATIVE NOTE - NSICDXBRIEFPROCEDURE_GEN_ALL_CORE_FT
PROCEDURES:  Fusion, spine, lumbar, interbody, 1 or 2 levels, posterior approach 07-Oct-2022 14:11:38  Cl Haley  Lumbar laminectomy 07-Oct-2022 14:11:58  Cl Haley

## 2022-10-07 NOTE — CONSULT NOTE ADULT - SUBJECTIVE AND OBJECTIVE BOX
CC: Post op facial swelling  HPI: 58 y/o female with hx of HLD, XIOMARA on Nocturnal CPAP, lumbar radiculopathy s/p L4-LS1 laminectomy today with no reported arlene-operative issues. Patient in PACU noted by Daughter to have eyelid, upper facial edema. Patient of not was prone during surgery and case reported was 5 hours. Patient denies anything like this ever happening before. No reported erythema, pruritis, tongue, throat swelling. Normal phonation and swallowing, no stridor. No other systemic complaints ie hives or edema. She was given benadryl IV preemptively for concern of allergic reaction. Patient states only known allergy is to iodine which she found out she had in the past when she had an o/p test done and it caused hives. At the time of exam, Ortho PA, Nurse, Patient all report edema is much improved and patient at this time with some B/L cheek and eyelid edema. No other complaints. Denies CP, SOB, N/V, HA, focal weakness. Has some mild pain at incision site.   PMHx: As above  All: Iodine-Hives  PSurghx: H/O  section x3, lumbar discectomy 2020, History of ankle surgery right ankle, 3/2022, History of carpal tunnel surgery of left wrist , History of carpal tunnel surgery of right wrist 2021, History of cholecystectomy, History of colonoscopy , History of left hip replacement 2021, S/P partial hysterectomy .  Famhx: DM- both parents  Sochx: Denies smoking, etoh, illicit drugs, works as CNA  Home Meds: Vitamin D, Tramadol, Pravastatin, Phentermine, Neurontin   VS:  Vital Signs Last 24 Hrs  T(C): 36.7 (07 Oct 2022 16:30), Max: 36.7 (07 Oct 2022 14:37)  T(F): 98 (07 Oct 2022 16:30), Max: 98 (07 Oct 2022 14:37)  HR: 102 (07 Oct 2022 20:24) (67 - 102)  BP: 115/73 (07 Oct 2022 20:24) (93/52 - 121/75)  BP(mean): 62 (07 Oct 2022 16:00) (60 - 68)  RR: 18 (07 Oct 2022 20:24) (13 - 18)  SpO2: 96% (07 Oct 2022 20:24) (96% - 100%)  GEN: Awake, alert, comfortable in NAD  HEENT: NC/AT, mild lid edema, facial edema, no erythema, no urticaria. oral cavity clear  Neck: no JVD, Supple   CVS:S1S2, no m/r/g  Pulm: CTA B/L  Abd: soft  Back: Post op drain in place with serosanguinous fluid, dressing C/D/I   Extrem: No C/C/E  Psych: normal affect  Skin: no rash/erythema  Neuro: AAOX4    Labs:  -Reviewed     A/P:  58 y/o female s/p L4-S1 lumbar laminectomy, post op facial swelling, hx of XIOMARA, HLD  -Patient edema appears to be dependent from likely prolong surgery in a prone position causing impedence to venous return  -No systemic signs/symptoms of allergic reaction, has had multiple prior surgeries in the past with no reactions to anesthesia  -No signs of angioedema   -Will cont. to monitor off any further anti-histamines/steroids  -Cont. remainder of post op wound care per Ortho  -cont. home CPAP HS  -Statin for HLD, cardiac diet  -VC boots while in bed  -Medicine will follow   -Discussed with Ortho PA, Nurse, Patient who all agree with above plan

## 2022-10-07 NOTE — DISCHARGE NOTE PROVIDER - NSDCMRMEDTOKEN_GEN_ALL_CORE_FT
gabapentin 300 mg oral capsule: 1 cap(s) orally once a day (at bedtime)  phentermine 37.5 mg oral tablet: 1 tab(s) orally once a day  pravastatin 20 mg oral tablet: 1 tab(s) orally once a day  traMADol 50 mg oral tablet: 1 tab(s) orally 3 times a day, As Needed  Vitamin D2 1.25 mg (50,000 intl units) oral capsule: 1 cap(s) orally once a week   acetaminophen 325 mg oral tablet: 3 tab(s) orally every 8 hours  gabapentin 300 mg oral capsule: 1 cap(s) orally 2 times a day  methocarbamol 750 mg oral tablet: 1 tab(s) orally 3 times a day, As needed, Muscle Spasm MDD:3 tabs  oxyCODONE 5 mg oral tablet: 1 tab(s) orally every 6 hours as needed for pain MDD:4 tabs  phentermine 37.5 mg oral tablet: 1 tab(s) orally once a day  pravastatin 20 mg oral tablet: 1 tab(s) orally once a day  Senna S 50 mg-8.6 mg oral tablet: 2 tab(s) orally once a day (at bedtime) MDD:2 tabs  Vitamin D2 1.25 mg (50,000 intl units) oral capsule: 1 cap(s) orally once a week

## 2022-10-07 NOTE — BRIEF OPERATIVE NOTE - NSICDXBRIEFPOSTOP_GEN_ALL_CORE_FT
POST-OP DIAGNOSIS:  Spondylolisthesis, lumbar region 07-Oct-2022 14:12:40  Cl Haley  Lumbar disc herniation with radiculopathy 07-Oct-2022 14:12:50  Cl Haley

## 2022-10-08 PROCEDURE — 99232 SBSQ HOSP IP/OBS MODERATE 35: CPT

## 2022-10-08 RX ORDER — OXYCODONE HYDROCHLORIDE 5 MG/1
5 TABLET ORAL EVERY 4 HOURS
Refills: 0 | Status: DISCONTINUED | OUTPATIENT
Start: 2022-10-08 | End: 2022-10-11

## 2022-10-08 RX ORDER — OXYCODONE HYDROCHLORIDE 5 MG/1
10 TABLET ORAL EVERY 6 HOURS
Refills: 0 | Status: DISCONTINUED | OUTPATIENT
Start: 2022-10-08 | End: 2022-10-11

## 2022-10-08 RX ADMIN — OXYCODONE HYDROCHLORIDE 5 MILLIGRAM(S): 5 TABLET ORAL at 22:04

## 2022-10-08 RX ADMIN — ATORVASTATIN CALCIUM 10 MILLIGRAM(S): 80 TABLET, FILM COATED ORAL at 22:04

## 2022-10-08 RX ADMIN — Medication 975 MILLIGRAM(S): at 22:30

## 2022-10-08 RX ADMIN — Medication 975 MILLIGRAM(S): at 13:16

## 2022-10-08 RX ADMIN — Medication 975 MILLIGRAM(S): at 13:59

## 2022-10-08 RX ADMIN — Medication 975 MILLIGRAM(S): at 06:04

## 2022-10-08 RX ADMIN — Medication 100 MILLIGRAM(S): at 06:03

## 2022-10-08 RX ADMIN — OXYCODONE HYDROCHLORIDE 5 MILLIGRAM(S): 5 TABLET ORAL at 22:30

## 2022-10-08 RX ADMIN — METHOCARBAMOL 750 MILLIGRAM(S): 500 TABLET, FILM COATED ORAL at 13:16

## 2022-10-08 RX ADMIN — Medication 975 MILLIGRAM(S): at 06:09

## 2022-10-08 RX ADMIN — SODIUM CHLORIDE 125 MILLILITER(S): 9 INJECTION, SOLUTION INTRAVENOUS at 06:04

## 2022-10-08 RX ADMIN — GABAPENTIN 300 MILLIGRAM(S): 400 CAPSULE ORAL at 22:04

## 2022-10-08 RX ADMIN — Medication 975 MILLIGRAM(S): at 22:04

## 2022-10-08 NOTE — PROGRESS NOTE ADULT - ASSESSMENT
Assessment and Plan-  58 yo F s/p L4-S1 laminectomy, L4-S1 transforaminal lumbar interbody fusion, posterior instrumentation and fusion with use of BMP-2, allograft, autograft, CT guided sterotactic navigation POD#1  - Mobilize daily with PT  - Gabapentin for RLE radicular pain  - Maintain remaining MCKINLEY drain until < 30-cc/shift-  - Mech DVT ppx only.  No chem DVT ppx due to risk of epidural hematoma.     Cl Haley  Spine Medicine and Surgery [facility]  [phone]

## 2022-10-08 NOTE — PROGRESS NOTE ADULT - ASSESSMENT
A/P:  56 y/o female s/p L4-S1 lumbar laminectomy, post op facial swelling, hx of XIOMARA, HLD     plan:     Facial swelling: I might have been  dependent from likely prolong surgery in a prone position causing impedence to venous return  or could be allergic reaction as per patient she recalled having facial swelling after spinal anesthesia  her swelling has been much better   denies sob or difficulty swallowing     Lower back pain s/p L4 and S1 Laminectomy:     - post op no complications  - VS stable  - abx per ortho   - opiate induced constipation regimen   - encouraging incentive spirometry   -c/w local wound care per ortho   -DVT prophylaxis and Pain meds as per Ortho team   -PT/OT and weight bearing per ortho     Hx of sleep apnea: cont. home CPAP HS    HLDL will continuer with home dose of statins

## 2022-10-08 NOTE — PROGRESS NOTE ADULT - SUBJECTIVE AND OBJECTIVE BOX
JESSENIAСВЕТЛАНА WILEY    [id number]    57y      Female    Patient is a 57y old  Female who presents with a chief complaint of L4-S1 TLIF (07 Oct 2022 14:27)      INTERVAL HPI/OVERNIGHT EVENTS:      facial swelling is better, denies any issues with swallowing, sob or cough, has no chest pain, nausea, vomiting     REVIEW OF SYSTEMS:    CONSTITUTIONAL: No fever, fatigue  RESPIRATORY: No cough, No shortness of breath  CARDIOVASCULAR: No chest pain, palpitations  GASTROINTESTINAL: No abdominal, No nausea, vomiting  NEUROLOGICAL: No headaches,  loss of strength.  MISCELLANEOUS: No joint swelling or pain       Vital Signs Last 24 Hrs  T(C): 36.6 (08 Oct 2022 08:17), Max: 36.7 (07 Oct 2022 14:37)  T(F): 97.9 (08 Oct 2022 08:17), Max: 98 (07 Oct 2022 14:37)  HR: 86 (08 Oct 2022 08:17) (82 - 102)  BP: 98/59 (08 Oct 2022 08:17) (93/52 - 121/75)  BP(mean): 62 (07 Oct 2022 16:00) (60 - 68)  RR: 18 (08 Oct 2022 08:17) (13 - 18)  SpO2: 95% (08 Oct 2022 08:17) (95% - 98%)    Parameters below as of 08 Oct 2022 08:17  Patient On (Oxygen Delivery Method): room air        PHYSICAL EXAM:    GENERAL: Middle age female looking comfortable   HEENT: PERRL, +EOMI  NECK: soft, Supple, No JVD  CHEST/LUNG: Clear to auscultate bilaterally; No wheezing  HEART: S1S2+, Regular rate and rhythm; No murmurs  ABDOMEN: Soft, Nontender, Nondistended; Bowel sounds present  EXTREMITIES:  1+ Peripheral Pulses, No edema  SKIN: No rashes or lesions  NEURO: AAOX3, no focal deficits, no motor r sensory loss  PSYCH: normal mood  Back: Clean dressing on with drains in place.       LABS:                        12.1   9.79  )-----------( 219      ( 07 Oct 2022 14:52 )             36.3     10-07    137  |  104  |  13.0  ----------------------------<  131<H>  4.1   |  18.0<L>  |  0.71    Ca    9.1      07 Oct 2022 14:52              I&O's Summary    07 Oct 2022 07:01  -  08 Oct 2022 07:00  --------------------------------------------------------  IN: 875 mL / OUT: 5865 mL / NET: -4990 mL        MEDICATIONS  (STANDING):  acetaminophen     Tablet .. 975 milliGRAM(s) Oral every 8 hours  acetaminophen     Tablet .. 975 milliGRAM(s) Oral once  acetaminophen   IVPB .. 1000 milliGRAM(s) IV Intermittent once  atorvastatin 10 milliGRAM(s) Oral at bedtime  gabapentin 300 milliGRAM(s) Oral at bedtime  lactated ringers. 1000 milliLiter(s) (125 mL/Hr) IV Continuous <Continuous>    MEDICATIONS  (PRN):  diphenhydrAMINE 25 milliGRAM(s) Oral every 4 hours PRN Allergy symptoms  methocarbamol 750 milliGRAM(s) Oral three times a day PRN Muscle Spasm

## 2022-10-08 NOTE — PROGRESS NOTE ADULT - SUBJECTIVE AND OBJECTIVE BOX
Patient S/P L4-S1 TLIF pod#1.  Pt doing well.  Facial swelling has been progressively improving since taking benadryl.  No difficulty swallowing, no difficulty breathing.  Pt OOB with PT    Pt lying in bed NAD  Dressing lumbar spine c/d/i sameer drains in place x 2 (being managed by plastics) #1 30cc, #2 5cc  Pt lying in bed NAD  able to roll without difficulty  UE MS 5/5, sensation intact throughout  LE MS 5/5 sensation intact throughtout  but notes some tingling to toes noted                           12.1   9.79  )-----------( 219      ( 07 Oct 2022 14:52 )             36.3   10-07    137  |  104  |  13.0  ----------------------------<  131<H>  4.1   |  18.0<L>  |  0.71    Ca    9.1      07 Oct 2022 14:52        A/P: s/p L4-S1 TLID pod #1    PT  DVTP SCDs only  Pain control  sameer drains per plastics  will monitor   d/c planning home

## 2022-10-08 NOTE — PROGRESS NOTE ADULT - SUBJECTIVE AND OBJECTIVE BOX
Ortho Spine Attending Note    Patient seen and examined.  Agree with PA-C note and assessment.  Pt lying in bed comfortably.  No chest pain, no SOB.  Able to mobilize to bathroom today.  Complaining of some RLE radicular pain into R buttock, lateral hip with ambulation.  Facial swelling from last night improved.     AFVSS  A&O x 3  Motor: 5/5 throughout b/l LE  Sensation grossly intact b/l LE    Hgb 12.1    MCKINLEY drain: 55 cc last shift, 50 cc of bloody drainage in bulb now  MCKINLEY 2 self discontinued earlier today.

## 2022-10-09 PROCEDURE — 99232 SBSQ HOSP IP/OBS MODERATE 35: CPT

## 2022-10-09 RX ORDER — SENNA PLUS 8.6 MG/1
2 TABLET ORAL AT BEDTIME
Refills: 0 | Status: DISCONTINUED | OUTPATIENT
Start: 2022-10-09 | End: 2022-10-11

## 2022-10-09 RX ORDER — POLYETHYLENE GLYCOL 3350 17 G/17G
17 POWDER, FOR SOLUTION ORAL
Refills: 0 | Status: DISCONTINUED | OUTPATIENT
Start: 2022-10-09 | End: 2022-10-11

## 2022-10-09 RX ADMIN — ATORVASTATIN CALCIUM 10 MILLIGRAM(S): 80 TABLET, FILM COATED ORAL at 22:20

## 2022-10-09 RX ADMIN — GABAPENTIN 300 MILLIGRAM(S): 400 CAPSULE ORAL at 22:20

## 2022-10-09 RX ADMIN — Medication 975 MILLIGRAM(S): at 06:14

## 2022-10-09 RX ADMIN — POLYETHYLENE GLYCOL 3350 17 GRAM(S): 17 POWDER, FOR SOLUTION ORAL at 11:52

## 2022-10-09 RX ADMIN — Medication 975 MILLIGRAM(S): at 19:55

## 2022-10-09 RX ADMIN — OXYCODONE HYDROCHLORIDE 5 MILLIGRAM(S): 5 TABLET ORAL at 06:14

## 2022-10-09 RX ADMIN — OXYCODONE HYDROCHLORIDE 5 MILLIGRAM(S): 5 TABLET ORAL at 17:11

## 2022-10-09 RX ADMIN — Medication 975 MILLIGRAM(S): at 20:30

## 2022-10-09 RX ADMIN — OXYCODONE HYDROCHLORIDE 5 MILLIGRAM(S): 5 TABLET ORAL at 16:04

## 2022-10-09 RX ADMIN — SENNA PLUS 2 TABLET(S): 8.6 TABLET ORAL at 22:20

## 2022-10-09 RX ADMIN — OXYCODONE HYDROCHLORIDE 10 MILLIGRAM(S): 5 TABLET ORAL at 23:00

## 2022-10-09 RX ADMIN — OXYCODONE HYDROCHLORIDE 10 MILLIGRAM(S): 5 TABLET ORAL at 22:20

## 2022-10-09 RX ADMIN — Medication 975 MILLIGRAM(S): at 06:44

## 2022-10-09 RX ADMIN — OXYCODONE HYDROCHLORIDE 5 MILLIGRAM(S): 5 TABLET ORAL at 07:00

## 2022-10-09 RX ADMIN — OXYCODONE HYDROCHLORIDE 5 MILLIGRAM(S): 5 TABLET ORAL at 11:55

## 2022-10-09 RX ADMIN — OXYCODONE HYDROCHLORIDE 5 MILLIGRAM(S): 5 TABLET ORAL at 13:03

## 2022-10-09 NOTE — PROGRESS NOTE ADULT - SUBJECTIVE AND OBJECTIVE BOX
56 yo F s/p L4-S1 laminectomy, L4-S1 transforaminal lumbar interbody fusion, posterior instrumentation and fusion with use of BMP-2, allograft, autograft, CT guided sterotactic navigation POD#2  Pt is resting comfortably in bed, doing well  Pt participating in PT  No new complaints    Vital Signs Last 24 Hrs  T(C): 36.9 (09 Oct 2022 08:32), Max: 36.9 (09 Oct 2022 08:32)  T(F): 98.5 (09 Oct 2022 08:32), Max: 98.5 (09 Oct 2022 08:32)  HR: 74 (09 Oct 2022 08:32) (74 - 95)  BP: 112/73 (09 Oct 2022 08:32) (96/56 - 112/73)  BP(mean): --  RR: 18 (09 Oct 2022 08:32) (18 - 18)  SpO2: 98% (09 Oct 2022 08:32) (92% - 98%)    Parameters below as of 09 Oct 2022 08:32  Patient On (Oxygen Delivery Method): room air    PE:  Pt alert and oriented  Lumbar Dressing C/D/I   MCKINLEY drain in place and functioning well- 190 cc output overnight  able to roll without difficulty  UE MS 5/5, sensation to light touch intact throughout  LE MS 5/5 sensation to light touch intact throughout     A/P: 56 yo F s/p L4-S1 laminectomy, L4-S1 transforaminal lumbar interbody fusion, posterior instrumentation and fusion with use of BMP-2, allograft, autograft, CT guided sterotactic navigation    Continue PT  DVTP: SCD's only  Pain control  Monitor drain output  Hospitalist following  d/c planning home

## 2022-10-09 NOTE — PROGRESS NOTE ADULT - SUBJECTIVE AND OBJECTIVE BOX
D/w Dr. Bush-- expresses concerns for RLE swelling. Recommends u/s RLE to r/o DVT    PLAN discussed with Dr. Haley:  * RLE duplex ordered to r/o DVT  * patient currently otherwise denies c/p, sob at this time, VSS D/w Dr. Bush-- expresses concerns for RLE swelling. Recommends u/s RLE to r/o DVT    PLAN discussed with Dr. Haley:  * RLE duplex ordered to r/o DVT  * patient currently otherwise denies c/p, sob at this time, VSS    Addendum: < from: US Duplex Venous Lower Ext Ltd, Right (10.10.22 @ 02:01) >    ACC: [id number] EXAM:  US DPLX LWR EXT VEINS LTD RT                          PROCEDURE DATE:  10/10/2022          INTERPRETATION:  CLINICAL INFORMATION: Right leg pain and swelling    COMPARISON: None available.    TECHNIQUE: Duplex sonography of theRIGHT LOWER extremity veins with   color and spectral Doppler, with and without compression.    FINDINGS:    There is normal compressibility of the right common femoral, femoral and   popliteal veins.  The contralateral common femoral vein is patent.  Doppler examination shows normal spontaneous and phasic flow.    No calf vein thrombosis is detected.    IMPRESSION:  No evidence of right lower extremity deep venous thrombosis.    --- End of Report ---            SRINIVASA LAWTON MD; Attending Radiologist  This document has been electronically signed. Oct 10 2022  2:13AM    < end of copied text >

## 2022-10-09 NOTE — PROGRESS NOTE ADULT - SUBJECTIVE AND OBJECTIVE BOX
JESSENIA INEZ    [id number]    57y      Female    Patient is a 57y old  Female who presents with a chief complaint of s/p lumbar fusion (08 Oct 2022 20:13)      INTERVAL HPI/OVERNIGHT EVENTS:    facial swelling improved, her pain is well controlled     REVIEW OF SYSTEMS:    CONSTITUTIONAL: No fever, fatigue  RESPIRATORY: No cough, No shortness of breath  CARDIOVASCULAR: No chest pain, palpitations  GASTROINTESTINAL: No abdominal, No nausea, vomiting  NEUROLOGICAL: No headaches,  loss of strength.  MISCELLANEOUS: Back pain is well controlled       Vital Signs Last 24 Hrs  T(C): 36.9 (09 Oct 2022 08:32), Max: 36.9 (09 Oct 2022 08:32)  T(F): 98.5 (09 Oct 2022 08:32), Max: 98.5 (09 Oct 2022 08:32)  HR: 74 (09 Oct 2022 08:32) (74 - 95)  BP: 112/73 (09 Oct 2022 08:32) (96/56 - 112/73)  BP(mean): --  RR: 18 (09 Oct 2022 08:32) (18 - 18)  SpO2: 98% (09 Oct 2022 08:32) (92% - 98%)    Parameters below as of 09 Oct 2022 08:32  Patient On (Oxygen Delivery Method): room air        PHYSICAL EXAM:    GENERAL: Middle age female looking comfortable   HEENT: PERRL, +EOMI  NECK: soft, Supple, No JVD  CHEST/LUNG: Clear to auscultate bilaterally; No wheezing  HEART: S1S2+, Regular rate and rhythm; No murmurs  ABDOMEN: Soft, Nontender, Nondistended; Bowel sounds present  EXTREMITIES:  1+ Peripheral Pulses, No edema  SKIN: No rashes or lesions  NEURO: AAOX3, no focal deficits, no motor r sensory loss  PSYCH: normal mood  Back: Clean dressing on with drain in place, once drain was taken out      LABS:                        12.1   9.79  )-----------( 219      ( 07 Oct 2022 14:52 )             36.3     10-07    137  |  104  |  13.0  ----------------------------<  131<H>  4.1   |  18.0<L>  |  0.71    Ca    9.1      07 Oct 2022 14:52              I&O's Summary    08 Oct 2022 07:01  -  09 Oct 2022 07:00  --------------------------------------------------------  IN: 0 mL / OUT: 1470 mL / NET: -1470 mL    09 Oct 2022 07:01  -  09 Oct 2022 11:17  --------------------------------------------------------  IN: 0 mL / OUT: 390 mL / NET: -390 mL        MEDICATIONS  (STANDING):  acetaminophen     Tablet .. 975 milliGRAM(s) Oral every 8 hours  acetaminophen     Tablet .. 975 milliGRAM(s) Oral once  acetaminophen   IVPB .. 1000 milliGRAM(s) IV Intermittent once  atorvastatin 10 milliGRAM(s) Oral at bedtime  gabapentin 300 milliGRAM(s) Oral at bedtime  lactated ringers. 1000 milliLiter(s) (125 mL/Hr) IV Continuous <Continuous>  polyethylene glycol 3350 17 Gram(s) Oral two times a day  senna 2 Tablet(s) Oral at bedtime    MEDICATIONS  (PRN):  diphenhydrAMINE 25 milliGRAM(s) Oral every 4 hours PRN Allergy symptoms  methocarbamol 750 milliGRAM(s) Oral three times a day PRN Muscle Spasm  oxyCODONE    IR 5 milliGRAM(s) Oral every 4 hours PRN Moderate Pain (4 - 6)  oxyCODONE    IR 10 milliGRAM(s) Oral every 6 hours PRN Severe Pain (7 - 10)

## 2022-10-09 NOTE — PROGRESS NOTE ADULT - ASSESSMENT
A/P:  56 y/o female s/p L4-S1 lumbar laminectomy, post op facial swelling, hx of XIOMARA, HLD     plan:     Facial swelling: I might have been  dependent from likely prolong surgery in a prone position causing impedence to venous return  or could be allergic reaction as per patient she recalled having facial swelling after spinal anesthesia  her swelling has been much better   denies sob or difficulty swallowing   facial swelling resolved     Lower back pain s/p L4 and S1 Laminectomy:     - post op no complications  - VS stable  - abx per ortho   - opiate induced constipation regimen   - encouraging incentive spirometry   -c/w local wound care per ortho   -DVT prophylaxis and Pain meds as per Ortho team   -PT/OT and weight bearing per ortho     Hx of sleep apnea: cont. home CPAP HS    HLDL will continuer with home dose of statins    Patient is stable from the medicine point of view for discharge pending PT and Ortho eval, once her drain is out.

## 2022-10-10 PROCEDURE — 72100 X-RAY EXAM L-S SPINE 2/3 VWS: CPT | Mod: 26

## 2022-10-10 PROCEDURE — 99232 SBSQ HOSP IP/OBS MODERATE 35: CPT

## 2022-10-10 PROCEDURE — 93971 EXTREMITY STUDY: CPT | Mod: 26,RT

## 2022-10-10 RX ORDER — GABAPENTIN 400 MG/1
300 CAPSULE ORAL
Refills: 0 | Status: DISCONTINUED | OUTPATIENT
Start: 2022-10-10 | End: 2022-10-11

## 2022-10-10 RX ORDER — ONDANSETRON 8 MG/1
4 TABLET, FILM COATED ORAL
Refills: 0 | Status: DISCONTINUED | OUTPATIENT
Start: 2022-10-10 | End: 2022-10-11

## 2022-10-10 RX ADMIN — OXYCODONE HYDROCHLORIDE 5 MILLIGRAM(S): 5 TABLET ORAL at 18:39

## 2022-10-10 RX ADMIN — Medication 975 MILLIGRAM(S): at 14:49

## 2022-10-10 RX ADMIN — POLYETHYLENE GLYCOL 3350 17 GRAM(S): 17 POWDER, FOR SOLUTION ORAL at 06:00

## 2022-10-10 RX ADMIN — Medication 975 MILLIGRAM(S): at 05:59

## 2022-10-10 RX ADMIN — METHOCARBAMOL 750 MILLIGRAM(S): 500 TABLET, FILM COATED ORAL at 22:32

## 2022-10-10 RX ADMIN — Medication 975 MILLIGRAM(S): at 23:28

## 2022-10-10 RX ADMIN — POLYETHYLENE GLYCOL 3350 17 GRAM(S): 17 POWDER, FOR SOLUTION ORAL at 18:12

## 2022-10-10 RX ADMIN — SENNA PLUS 2 TABLET(S): 8.6 TABLET ORAL at 22:32

## 2022-10-10 RX ADMIN — Medication 975 MILLIGRAM(S): at 13:49

## 2022-10-10 RX ADMIN — GABAPENTIN 300 MILLIGRAM(S): 400 CAPSULE ORAL at 18:11

## 2022-10-10 RX ADMIN — Medication 975 MILLIGRAM(S): at 06:07

## 2022-10-10 RX ADMIN — ATORVASTATIN CALCIUM 10 MILLIGRAM(S): 80 TABLET, FILM COATED ORAL at 22:31

## 2022-10-10 RX ADMIN — ONDANSETRON 4 MILLIGRAM(S): 8 TABLET, FILM COATED ORAL at 11:15

## 2022-10-10 RX ADMIN — OXYCODONE HYDROCHLORIDE 5 MILLIGRAM(S): 5 TABLET ORAL at 11:13

## 2022-10-10 RX ADMIN — OXYCODONE HYDROCHLORIDE 5 MILLIGRAM(S): 5 TABLET ORAL at 18:09

## 2022-10-10 RX ADMIN — Medication 975 MILLIGRAM(S): at 22:31

## 2022-10-10 RX ADMIN — OXYCODONE HYDROCHLORIDE 5 MILLIGRAM(S): 5 TABLET ORAL at 11:33

## 2022-10-10 NOTE — PROGRESS NOTE ADULT - SUBJECTIVE AND OBJECTIVE BOX
ORTHO-SPINE POST-OP PROGRESS NOTE:      [id number]    JESSENIA WILEY      PROCEDURE: L4-S1 TLIF     DOS: 10/7      SUBJECTIVE: 57y Patient seen and examined. Patient reports of moderate discomfort that is controlled by pain medications. Patient c/o left leg pain when standing.           I&O's Detail    09 Oct 2022 07:01  -  10 Oct 2022 07:00  --------------------------------------------------------  IN:  Total IN: 0 mL    OUT:    Bulb (mL): 65 mL    Voided (mL): 350 mL  Total OUT: 415 mL    Total NET: -415 mL            acetaminophen     Tablet .. 975 milliGRAM(s) Oral every 8 hours  acetaminophen     Tablet .. 975 milliGRAM(s) Oral once  acetaminophen   IVPB .. 1000 milliGRAM(s) IV Intermittent once  atorvastatin 10 milliGRAM(s) Oral at bedtime  diphenhydrAMINE 25 milliGRAM(s) Oral every 4 hours PRN  gabapentin 300 milliGRAM(s) Oral at bedtime  lactated ringers. 1000 milliLiter(s) IV Continuous <Continuous>  methocarbamol 750 milliGRAM(s) Oral three times a day PRN  oxyCODONE    IR 5 milliGRAM(s) Oral every 4 hours PRN  oxyCODONE    IR 10 milliGRAM(s) Oral every 6 hours PRN  polyethylene glycol 3350 17 Gram(s) Oral two times a day  senna 2 Tablet(s) Oral at bedtime        T(C): 36.7 (10-10-22 @ 05:00), Max: 36.9 (10-09-22 @ 08:32)  HR: 73 (10-10-22 @ 05:00) (73 - 80)  BP: 95/58 (10-10-22 @ 05:00) (95/58 - 122/76)  RR: 18 (10-10-22 @ 05:00) (18 - 18)  SpO2: 97% (10-10-22 @ 05:00) (96% - 98%)  Wt(kg): --      PHYSICAL EXAM:     Constitutional: Alert, responsive, in no acute distress.     Spine:          Dressing: Clean/dry/intact WITH DRAIN NOTED. No active bleeding or discharge noted.            Drains:  present with OUTPUT of 25 cc overnight            Skin: Wound is clean and intact. No active discharge. No wound dehisence.            Sensation: normal to light touch. No focal deficits noted of the lower extremities.            Pathologic reflexes: No Soria,  No  Clonus                  Motor exam: 5/5 hip flexion, knee flexion and ankle plantar and dorsiflexion. No focal weaknesses noted.                                                        Vascular:  + warm well perfused; capillary refill <3 seconds                                                       A/P :  70y Female S/P   POD#     -  Pain control  -  DVT ppx: [x]SCDs    -  PT and out of bed today  -  Weight bearing status: WBAT [X]    -  Dispo: Home

## 2022-10-10 NOTE — PROGRESS NOTE ADULT - ASSESSMENT
A/P:  58 y/o female s/p L4-S1 lumbar laminectomy, post op facial swelling, hx of XIOMARA, HLD     plan:     Facial swelling: I might have been  dependent from likely prolong surgery in a prone position causing impedence to venous return  or could be allergic reaction as per patient she recalled having facial swelling after spinal anesthesia  her swelling has been much better   denies sob or difficulty swallowing   facial swelling resolved     Lower back pain s/p L4 and S1 Laminectomy:     - post op no complications  - VS stable  - abx per ortho   - opiate induced constipation regimen   - encouraging incentive spirometry   -c/w local wound care per ortho   -DVT prophylaxis and Pain meds as per Ortho team   -PT/OT and weight bearing per ortho     Hx of sleep apnea: cont. home CPAP HS    HLDL will continuer with home dose of statins    Patient is stable from the medicine point of view for discharge pending PT and Ortho eval, once her drain is out.        A/P:  56 y/o female s/p L4-S1 lumbar laminectomy, post op facial swelling, hx of XIOMARA, HLD     plan:     Facial swelling: I might have been  dependent from likely prolong surgery in a prone position causing impedence to venous return  or could be allergic reaction as per patient she recalled having facial swelling after spinal anesthesia  her swelling has been much better   denies sob or difficulty swallowing   facial swelling resolved.     Lower back pain s/p L4 and S1 Laminectomy:     - post op no complications  - VS stable  - abx per ortho   - opiate induced constipation regimen   - encouraging incentive spirometry   -c/w local wound care per ortho   -DVT prophylaxis and Pain meds as per Ortho team   -PT/OT and weight bearing per ortho    Hx of sleep apnea: cont. home CPAP HS.     HLDL will continuer with home dose of statins.     Patient is stable from the medicine point of view for discharge pending PT and Ortho eval, once her drain is out.

## 2022-10-10 NOTE — PROGRESS NOTE ADULT - SUBJECTIVE AND OBJECTIVE BOX
JESSENIA INEZ    [id number]    57y      Female    Patient is a 57y old  Female who presents with a chief complaint of s/p lumbar fusion (08 Oct 2022 20:13)      INTERVAL HPI/OVERNIGHT EVENTS:    patient is doing ok, her pain is well controlled, she has no chest pain, sob, dizziness, fever, chills.     REVIEW OF SYSTEMS:    CONSTITUTIONAL: No fever, fatigue  RESPIRATORY: No cough, No shortness of breath  CARDIOVASCULAR: No chest pain, palpitations  GASTROINTESTINAL: No abdominal, No nausea, vomiting  NEUROLOGICAL: No headaches,  loss of strength.  MISCELLANEOUS: Back pain is well controlled        Vital Signs Last 24 Hrs  T(C): 36.7 (10 Oct 2022 05:00), Max: 36.8 (09 Oct 2022 13:00)  T(F): 98 (10 Oct 2022 05:00), Max: 98.3 (09 Oct 2022 13:00)  HR: 73 (10 Oct 2022 05:00) (73 - 83)  BP: 95/58 (10 Oct 2022 05:00) (95/58 - 122/76)  BP(mean): --  RR: 18 (10 Oct 2022 05:00) (16 - 18)  SpO2: 97% (10 Oct 2022 05:00) (96% - 97%)    Parameters below as of 10 Oct 2022 05:00  Patient On (Oxygen Delivery Method): room air        PHYSICAL EXAM:    GENERAL: Middle age female looking comfortable   HEENT: PERRL, +EOMI  NECK: soft, Supple, No JVD  CHEST/LUNG: Clear to auscultate bilaterally; No wheezing  HEART: S1S2+, Regular rate and rhythm; No murmurs  ABDOMEN: Soft, Nontender, Nondistended; Bowel sounds present  EXTREMITIES:  1+ Peripheral Pulses, No edema  SKIN: No rashes or lesions  NEURO: AAOX3, no focal deficits, no motor r sensory loss  PSYCH: normal mood  Back: Clean dressing on with drain in place, once drain was taken out.       09 Oct 2022 07:01  -  10 Oct 2022 07:00  --------------------------------------------------------  IN: 0 mL / OUT: 415 mL / NET: -415 mL        MEDICATIONS  (STANDING):  acetaminophen     Tablet .. 975 milliGRAM(s) Oral every 8 hours  acetaminophen     Tablet .. 975 milliGRAM(s) Oral once  acetaminophen   IVPB .. 1000 milliGRAM(s) IV Intermittent once  atorvastatin 10 milliGRAM(s) Oral at bedtime  gabapentin 300 milliGRAM(s) Oral at bedtime  lactated ringers. 1000 milliLiter(s) (125 mL/Hr) IV Continuous <Continuous>  polyethylene glycol 3350 17 Gram(s) Oral two times a day  senna 2 Tablet(s) Oral at bedtime    MEDICATIONS  (PRN):  diphenhydrAMINE 25 milliGRAM(s) Oral every 4 hours PRN Allergy symptoms  methocarbamol 750 milliGRAM(s) Oral three times a day PRN Muscle Spasm  oxyCODONE    IR 5 milliGRAM(s) Oral every 4 hours PRN Moderate Pain (4 - 6)  oxyCODONE    IR 10 milliGRAM(s) Oral every 6 hours PRN Severe Pain (7 - 10)         JESSENIA NIEZ    [id number]    57y      Female    Patient is a 57y old  Female who presents with a chief complaint of s/p lumbar fusion (08 Oct 2022 20:13)      INTERVAL HPI/OVERNIGHT EVENTS:    patient is doing ok, her pain is well controlled, she has no chest pain, sob, dizziness, fever, chills.     REVIEW OF SYSTEMS:    CONSTITUTIONAL: No fever, fatigue  RESPIRATORY: No cough, No shortness of breath  CARDIOVASCULAR: No chest pain, palpitations  GASTROINTESTINAL: No abdominal, No nausea, vomiting  NEUROLOGICAL: No headaches,  loss of strength.  MISCELLANEOUS: Back pain is well controlled      Vital Signs Last 24 Hrs  T(C): 36.7 (10 Oct 2022 05:00), Max: 36.8 (09 Oct 2022 13:00)  T(F): 98 (10 Oct 2022 05:00), Max: 98.3 (09 Oct 2022 13:00)  HR: 73 (10 Oct 2022 05:00) (73 - 83)  BP: 95/58 (10 Oct 2022 05:00) (95/58 - 122/76)  RR: 18 (10 Oct 2022 05:00) (16 - 18)  SpO2: 97% (10 Oct 2022 05:00) (96% - 97%)    Parameters below as of 10 Oct 2022 05:00  Patient On (Oxygen Delivery Method): room air        PHYSICAL EXAM:    GENERAL: Middle age female looking comfortable   HEENT: PERRL, +EOMI  NECK: soft, Supple, No JVD  CHEST/LUNG: Clear to auscultate bilaterally; No wheezing  HEART: S1S2+, Regular rate and rhythm; No murmurs  ABDOMEN: Soft, Nontender, Nondistended; Bowel sounds present  EXTREMITIES:  1+ Peripheral Pulses, No edema  SKIN: No rashes or lesions  NEURO: AAOX3, no focal deficits, no motor r sensory loss  PSYCH: normal mood  Back: Clean dressing on with drain in place, once drain was taken out.       09 Oct 2022 07:01  -  10 Oct 2022 07:00  --------------------------------------------------------  IN: 0 mL / OUT: 415 mL / NET: -415 mL        MEDICATIONS  (STANDING):  acetaminophen     Tablet .. 975 milliGRAM(s) Oral every 8 hours  acetaminophen     Tablet .. 975 milliGRAM(s) Oral once  acetaminophen   IVPB .. 1000 milliGRAM(s) IV Intermittent once  atorvastatin 10 milliGRAM(s) Oral at bedtime  gabapentin 300 milliGRAM(s) Oral at bedtime  lactated ringers. 1000 milliLiter(s) (125 mL/Hr) IV Continuous <Continuous>  polyethylene glycol 3350 17 Gram(s) Oral two times a day  senna 2 Tablet(s) Oral at bedtime    MEDICATIONS  (PRN):  diphenhydrAMINE 25 milliGRAM(s) Oral every 4 hours PRN Allergy symptoms  methocarbamol 750 milliGRAM(s) Oral three times a day PRN Muscle Spasm  oxyCODONE    IR 5 milliGRAM(s) Oral every 4 hours PRN Moderate Pain (4 - 6)  oxyCODONE    IR 10 milliGRAM(s) Oral every 6 hours PRN Severe Pain (7 - 10)

## 2022-10-11 ENCOUNTER — TRANSCRIPTION ENCOUNTER (OUTPATIENT)
Age: 57
End: 2022-10-11

## 2022-10-11 VITALS
SYSTOLIC BLOOD PRESSURE: 129 MMHG | RESPIRATION RATE: 18 BRPM | TEMPERATURE: 98 F | OXYGEN SATURATION: 98 % | HEART RATE: 89 BPM | DIASTOLIC BLOOD PRESSURE: 74 MMHG

## 2022-10-11 PROCEDURE — 99232 SBSQ HOSP IP/OBS MODERATE 35: CPT

## 2022-10-11 PROCEDURE — C1889: CPT

## 2022-10-11 PROCEDURE — 72100 X-RAY EXAM L-S SPINE 2/3 VWS: CPT

## 2022-10-11 PROCEDURE — 97163 PT EVAL HIGH COMPLEX 45 MIN: CPT

## 2022-10-11 PROCEDURE — 97110 THERAPEUTIC EXERCISES: CPT

## 2022-10-11 PROCEDURE — 93971 EXTREMITY STUDY: CPT

## 2022-10-11 PROCEDURE — 80048 BASIC METABOLIC PNL TOTAL CA: CPT

## 2022-10-11 PROCEDURE — 88304 TISSUE EXAM BY PATHOLOGIST: CPT

## 2022-10-11 PROCEDURE — 82962 GLUCOSE BLOOD TEST: CPT

## 2022-10-11 PROCEDURE — 76000 FLUOROSCOPY <1 HR PHYS/QHP: CPT

## 2022-10-11 PROCEDURE — C1713: CPT

## 2022-10-11 PROCEDURE — 85027 COMPLETE CBC AUTOMATED: CPT

## 2022-10-11 PROCEDURE — 36415 COLL VENOUS BLD VENIPUNCTURE: CPT

## 2022-10-11 PROCEDURE — 97116 GAIT TRAINING THERAPY: CPT

## 2022-10-11 RX ORDER — TRAMADOL HYDROCHLORIDE 50 MG/1
1 TABLET ORAL
Qty: 0 | Refills: 0 | DISCHARGE

## 2022-10-11 RX ORDER — OXYCODONE HYDROCHLORIDE 5 MG/1
1 TABLET ORAL
Qty: 28 | Refills: 0
Start: 2022-10-11 | End: 2022-10-17

## 2022-10-11 RX ORDER — METHOCARBAMOL 500 MG/1
1 TABLET, FILM COATED ORAL
Qty: 21 | Refills: 0
Start: 2022-10-11 | End: 2022-10-17

## 2022-10-11 RX ORDER — GABAPENTIN 400 MG/1
1 CAPSULE ORAL
Qty: 0 | Refills: 0 | DISCHARGE

## 2022-10-11 RX ORDER — SENNOSIDES/DOCUSATE SODIUM 8.6MG-50MG
2 TABLET ORAL
Qty: 14 | Refills: 0
Start: 2022-10-11 | End: 2022-10-17

## 2022-10-11 RX ORDER — ACETAMINOPHEN 500 MG
3 TABLET ORAL
Qty: 0 | Refills: 0 | DISCHARGE
Start: 2022-10-11

## 2022-10-11 RX ADMIN — Medication 975 MILLIGRAM(S): at 05:14

## 2022-10-11 RX ADMIN — METHOCARBAMOL 750 MILLIGRAM(S): 500 TABLET, FILM COATED ORAL at 05:14

## 2022-10-11 RX ADMIN — OXYCODONE HYDROCHLORIDE 5 MILLIGRAM(S): 5 TABLET ORAL at 04:01

## 2022-10-11 RX ADMIN — Medication 975 MILLIGRAM(S): at 06:00

## 2022-10-11 RX ADMIN — GABAPENTIN 300 MILLIGRAM(S): 400 CAPSULE ORAL at 05:14

## 2022-10-11 RX ADMIN — OXYCODONE HYDROCHLORIDE 5 MILLIGRAM(S): 5 TABLET ORAL at 03:21

## 2022-10-11 RX ADMIN — OXYCODONE HYDROCHLORIDE 5 MILLIGRAM(S): 5 TABLET ORAL at 16:00

## 2022-10-11 RX ADMIN — POLYETHYLENE GLYCOL 3350 17 GRAM(S): 17 POWDER, FOR SOLUTION ORAL at 05:14

## 2022-10-11 NOTE — PROGRESS NOTE ADULT - PROVIDER SPECIALTY LIST ADULT
Orthopedics
Hospitalist
Orthopedics
Orthopedics
Plastic Surgery
Plastic Surgery

## 2022-10-11 NOTE — DISCHARGE NOTE NURSING/CASE MANAGEMENT/SOCIAL WORK - PATIENT PORTAL LINK FT
You can access the FollowMyHealth Patient Portal offered by [facility] by registering at the following website: [email or web address]. By joining [employer]’s FollowMyHealth portal, you will also be able to view your health information using other applications (apps) compatible with our system.

## 2022-10-11 NOTE — PROGRESS NOTE ADULT - ASSESSMENT
A/P:  58 y/o female s/p L4-S1 lumbar laminectomy, post op facial swelling, hx of XIOMARA, HLD     plan:     Facial swelling: I might have been  dependent from likely prolong surgery in a prone position causing impedence to venous return  or could be allergic reaction as per patient she recalled having facial swelling after spinal anesthesia  her swelling has been much better   denies sob or difficulty swallowing   facial swelling resolved.     Lower back pain s/p L4 and S1 Laminectomy:     - post op no complications  - VS stable  - abx per ortho   - opiate induced constipation regimen   - encouraging incentive spirometry   -c/w local wound care per ortho   -DVT prophylaxis and Pain meds as per Ortho team   -PT/OT and weight bearing per ortho  -still have one drain     Hx of sleep apnea: cont. home CPAP HS.     HLDL will continuer with home dose of statins.     Patient is stable from the medicine point of view for discharge pending PT and Ortho eval, once her drain is out.

## 2022-10-11 NOTE — PROGRESS NOTE ADULT - REASON FOR ADMISSION
Spinal surgery and reconstruction
s/p lumbar fusion
Spine surgery and reconstruction
s/p lumbar fusion

## 2022-10-11 NOTE — PROGRESS NOTE ADULT - SUBJECTIVE AND OBJECTIVE BOX
Ortho Spine Attending Note    Patient seen and examined.  Agree with PAJerseyC note and assessment.  Pt lying in bed comfortably.  No chest pain, no SOB.  RLE radicular pain improved.  Patient developed LLE radicular pain 2 days ago when ambulating with PT.  Now reports LLE radicular pain whenever she weight bears.  Improves when lying down or sitting.  Duplex negative for DVT     AFVSS  A&O x 3  Motor: 5/5 throughout b/l LE  Sensation grossly intact b/l LE    Drain: 30 cc overnight    Standing XR Lumbar spine reviewed - all instrumentation and interbody spacers in acceptable position

## 2022-10-11 NOTE — PROGRESS NOTE ADULT - SUBJECTIVE AND OBJECTIVE BOX
JESSENIA WILEY    [id number]    POST OPERATIVE DAY #: 4  STATUS POST: L4-S1 posterior lumbar fusion                      SUBJECTIVE: Patient seen and examined at bedside. Patient comfortable in bed at this time. Patient has been c/o LLE radicular pain, particularly when ambulating. Dr Haley made aware, plain films obtained and reviewed. Dr Haley ordered patient LSO brace to be worn when OOB for comfort.  Patient reports subjective parasthesias in feet due to baseline neuropathy, unchanged since pre-op. Patient denies any acute motor decline.     Pain (0-10):     OBJECTIVE:     Vital Signs Last 24 Hrs  T(C): 36.8 (11 Oct 2022 09:20), Max: 36.8 (10 Oct 2022 21:15)  T(F): 98.2 (11 Oct 2022 09:20), Max: 98.3 (10 Oct 2022 21:15)  HR: 84 (11 Oct 2022 09:20) (74 - 84)  BP: 116/75 (11 Oct 2022 09:20) (103/64 - 147/81)  BP(mean): --  RR: 18 (11 Oct 2022 09:20) (18 - 18)  SpO2: 95% (11 Oct 2022 09:20) (95% - 97%)    Parameters below as of 11 Oct 2022 09:20  Patient On (Oxygen Delivery Method): room air      I&O's Summary    10 Oct 2022 07:01  -  11 Oct 2022 07:00  --------------------------------------------------------  IN: 350 mL / OUT: 75 mL / NET: 275 mL      Constitutional:Alert, responsive, in no acute distress.   Abdominal: soft and supple non distended  Lympatics: no pretibial pitting edema    Spine:          Dressing: clean/dry/intact          Drains: HV drain noted with adequate suction, bloody output in canister          Sensation: SILT with baseline subjective paresthesias b/l feet due to peripheral neuropathy         Motor exam:                        Lower extremity          HF(l2)   KE(l3)    TA(l4)   EHL(l5)    GS(s1)                                                 R        5/5        5/5        5/5       5/5         5/5                                               L         5/5        5/5       5/5       5/5          5/5                                                 Vascular: warm well perfused; capillary refill <3 seconds                                                        LABS:    RADIOLOGY & ADDITIONAL STUDIES:    A/P :  57y Female S/P  L4-S1  POD#4  -    Pain control  -    DVT ppx: SCDs  -    Physical Therapy  -    Monitor drain output  -    Weight bearing status: WBAT, LSO for comfort  -    Dispo: Home

## 2022-10-11 NOTE — PROGRESS NOTE ADULT - SUBJECTIVE AND OBJECTIVE BOX
JESSENIA INEZ    [id number]    57y      Female    Patient is a 57y old  Female who presents with a chief complaint of s/p lumbar fusion (11 Oct 2022 09:00)      INTERVAL HPI/OVERNIGHT EVENTS:    patient is doing ok, she has no complains, her pain is well controlled.     REVIEW OF SYSTEMS:    CONSTITUTIONAL: No fever, some fatigue  RESPIRATORY: No cough, No shortness of breath  CARDIOVASCULAR: No chest pain, palpitations  GASTROINTESTINAL: No abdominal, No nausea, vomiting  NEUROLOGICAL: No headaches,  loss of strength.  MISCELLANEOUS: Back pain is well controlled       Vital Signs Last 24 Hrs  T(C): 36.8 (11 Oct 2022 12:45), Max: 36.8 (10 Oct 2022 21:15)  T(F): 98.3 (11 Oct 2022 12:45), Max: 98.3 (10 Oct 2022 21:15)  HR: 89 (11 Oct 2022 12:45) (74 - 89)  BP: 129/74 (11 Oct 2022 12:45) (103/64 - 147/81)  RR: 18 (11 Oct 2022 12:45) (18 - 18)  SpO2: 98% (11 Oct 2022 12:45) (95% - 98%)    Parameters below as of 11 Oct 2022 12:45  Patient On (Oxygen Delivery Method): room air        PHYSICAL EXAM:    GENERAL: Middle age female looking comfortable   HEENT: PERRL, +EOMI  NECK: soft, Supple, No JVD  CHEST/LUNG: Clear to auscultate bilaterally; No wheezing  HEART: S1S2+, Regular rate and rhythm; No murmurs  ABDOMEN: Soft, Nontender, Nondistended; Bowel sounds present  EXTREMITIES:  1+ Peripheral Pulses, No edema  SKIN: No rashes or lesions  NEURO: AAOX3, no focal deficits, no motor r sensory loss  PSYCH: normal mood  Back: Clean dressing on with drain in place, once drain was taken out.       I&O's Summary    10 Oct 2022 07:01  -  11 Oct 2022 07:00  --------------------------------------------------------  IN: 350 mL / OUT: 75 mL / NET: 275 mL        MEDICATIONS  (STANDING):  acetaminophen     Tablet .. 975 milliGRAM(s) Oral every 8 hours  acetaminophen     Tablet .. 975 milliGRAM(s) Oral once  acetaminophen   IVPB .. 1000 milliGRAM(s) IV Intermittent once  atorvastatin 10 milliGRAM(s) Oral at bedtime  gabapentin 300 milliGRAM(s) Oral two times a day  lactated ringers. 1000 milliLiter(s) (125 mL/Hr) IV Continuous <Continuous>  polyethylene glycol 3350 17 Gram(s) Oral two times a day  senna 2 Tablet(s) Oral at bedtime    MEDICATIONS  (PRN):  diphenhydrAMINE 25 milliGRAM(s) Oral every 4 hours PRN Allergy symptoms  methocarbamol 750 milliGRAM(s) Oral three times a day PRN Muscle Spasm  ondansetron Injectable 4 milliGRAM(s) IV Push four times a day PRN Nausea and/or Vomiting  oxyCODONE    IR 5 milliGRAM(s) Oral every 4 hours PRN Moderate Pain (4 - 6)  oxyCODONE    IR 10 milliGRAM(s) Oral every 6 hours PRN Severe Pain (7 - 10)

## 2022-10-11 NOTE — PROGRESS NOTE ADULT - ASSESSMENT
Assessment and Plan-  56 yo F s/p L4-S1 laminectomy, L4-S1 transforaminal lumbar interbody fusion, posterior instrumentation and fusion with use of BMP-2, allograft, autograft, CT guided sterotactic navigation POD#4  - Clear for d/c home today   - Discontinue drain this AM   - Continue Gabapentin for RLE radicular pain  - Mech DVT ppx only.  No chem DVT ppx due to risk of epidural hematoma.     Cl Haley  Spine Medicine and Surgery [facility]  [phone]

## 2022-10-12 LAB — SURGICAL PATHOLOGY STUDY: SIGNIFICANT CHANGE UP

## 2022-11-02 ENCOUNTER — APPOINTMENT (OUTPATIENT)
Dept: ORTHOPEDIC SURGERY | Facility: CLINIC | Age: 57
End: 2022-11-02
Payer: OTHER MISCELLANEOUS

## 2022-11-02 VITALS — HEIGHT: 65 IN | WEIGHT: 206 LBS | BODY MASS INDEX: 34.32 KG/M2

## 2022-11-02 PROBLEM — E78.5 HYPERLIPIDEMIA, UNSPECIFIED: Chronic | Status: ACTIVE | Noted: 2022-09-28

## 2022-11-02 PROBLEM — M54.16 RADICULOPATHY, LUMBAR REGION: Chronic | Status: ACTIVE | Noted: 2022-09-28

## 2022-11-02 PROBLEM — G47.33 OBSTRUCTIVE SLEEP APNEA (ADULT) (PEDIATRIC): Chronic | Status: ACTIVE | Noted: 2022-09-28

## 2022-11-02 PROBLEM — M54.50 LOW BACK PAIN, UNSPECIFIED: Chronic | Status: ACTIVE | Noted: 2022-09-28

## 2022-11-02 PROCEDURE — 95864 NEEDLE EMG 4 EXTREMITIES: CPT

## 2022-11-02 PROCEDURE — 99072 ADDL SUPL MATRL&STAF TM PHE: CPT

## 2022-11-02 PROCEDURE — 99213 OFFICE O/P EST LOW 20 MIN: CPT

## 2022-11-02 NOTE — PHYSICAL EXAM
[Right] : right foot and ankle [4___] : eversion 4[unfilled]/5 [2+] : dorsalis pedis pulse: 2+ [] : no calf tenderness [de-identified] : decreased 4/5 toes [de-identified] : plantar flexion 30 degrees [TWNoteComboBox7] : dorsiflexion 15 degrees

## 2022-11-02 NOTE — HISTORY OF PRESENT ILLNESS
[Work related] : work related [Sudden] : sudden [Ice] : ice [6] : 6 [Burning] : burning [Localized] : localized [Shooting] : shooting [Rest] : rest [Standing] : standing [Walking] : walking [Disabled] : Work status: disabled [de-identified] : Patient Complaint - WC 3/3/20 L Ankle and consequential R ankle pain. No prior\par Securing combative patient\par 10/21/20 PT/HEP helpful Not working\par 11/4/20 f/u R ankle Brace helpful, HEP,PT Not working\par 12/16/20202: f/u R ankle. Brace very helpful. Pt/hep. not working\par 2/10/21: f/u R ankle. Brace. hep. Not working\par 3/24/21 f/u R ankle HEP/Brace PT Not working\par 5/5/21: f/u R ankle. brace/hep/PT. Using cane for ambulation. Also having hip issues and waiting to get scheduled for\par THR by Dr. Schumacher Not working\par 6/30/21: f/u R ankle. brace/hep/PT. Using cane for ambulation. Not working. Awaiting scheduling for L THR.\par Ibuprofen/Naprosyn prn.\par 8/11/21: f/u R ankle. She had SHAHID on 7/26/21. Ambulation with cane\par 9/29/21 f/u R ankle PT/brace. Spine eval pending\par 11/10/21: f/u R ankle. Pt/brace. Disability/back issues, considering spine surgery\par 1/5/22: f/u R ankle. mri results. continued pain.\par 2/2/22: f/u R ankle. Pre-Op Peeroneal tendon not walking Pain mgt on Tramadol\par \par 3/16/22: Right Ankle Peroneal Tendon Repair\par \par 3/23/22: f/u R ankle. NWB in splint. Doing ok.\par 5/4/22 f/u R ankle  HEP/PT  had pinpoint drainge  -f/c  PT  Not working/Disability\par 8/10/22: f/u R ankle. Pt/hep. Numbness lateral foot\par 11/2/22: f/u R ankle. hep. Waiting for auth to continue physical therapy. Recently recovering from back surgery. still having pain and numbness.  [] : no [FreeTextEntry1] : rt ankle [FreeTextEntry3] : 3-3-20 [FreeTextEntry9] : elevate [de-identified] : pt hasn’t had EMG,MRI, or PT [de-identified] : 3-16-22 [de-identified] : transfer single tendon/tenodesis

## 2022-12-09 ENCOUNTER — EMERGENCY (EMERGENCY)
Facility: HOSPITAL | Age: 57
LOS: 1 days | Discharge: HOME | End: 2022-12-09
Attending: STUDENT IN AN ORGANIZED HEALTH CARE EDUCATION/TRAINING PROGRAM
Payer: COMMERCIAL

## 2022-12-09 VITALS
HEART RATE: 92 BPM | TEMPERATURE: 98 F | SYSTOLIC BLOOD PRESSURE: 144 MMHG | HEIGHT: 65 IN | WEIGHT: 207.01 LBS | RESPIRATION RATE: 18 BRPM | OXYGEN SATURATION: 97 % | DIASTOLIC BLOOD PRESSURE: 82 MMHG

## 2022-12-09 DIAGNOSIS — Z98.890 OTHER SPECIFIED POSTPROCEDURAL STATES: Chronic | ICD-10-CM

## 2022-12-09 DIAGNOSIS — Z90.49 ACQUIRED ABSENCE OF OTHER SPECIFIED PARTS OF DIGESTIVE TRACT: Chronic | ICD-10-CM

## 2022-12-09 DIAGNOSIS — Z96.642 PRESENCE OF LEFT ARTIFICIAL HIP JOINT: Chronic | ICD-10-CM

## 2022-12-09 DIAGNOSIS — Z90.711 ACQUIRED ABSENCE OF UTERUS WITH REMAINING CERVICAL STUMP: Chronic | ICD-10-CM

## 2022-12-09 DIAGNOSIS — Z98.891 HISTORY OF UTERINE SCAR FROM PREVIOUS SURGERY: Chronic | ICD-10-CM

## 2022-12-09 LAB
ALBUMIN SERPL ELPH-MCNC: 4.1 G/DL — SIGNIFICANT CHANGE UP (ref 3.3–5.2)
ALP SERPL-CCNC: 111 U/L — SIGNIFICANT CHANGE UP (ref 40–120)
ALT FLD-CCNC: 21 U/L — SIGNIFICANT CHANGE UP
ANION GAP SERPL CALC-SCNC: 10 MMOL/L — SIGNIFICANT CHANGE UP (ref 5–17)
AST SERPL-CCNC: 20 U/L — SIGNIFICANT CHANGE UP
BILIRUB SERPL-MCNC: 0.5 MG/DL — SIGNIFICANT CHANGE UP (ref 0.4–2)
BUN SERPL-MCNC: 12.3 MG/DL — SIGNIFICANT CHANGE UP (ref 8–20)
CALCIUM SERPL-MCNC: 9.4 MG/DL — SIGNIFICANT CHANGE UP (ref 8.4–10.5)
CHLORIDE SERPL-SCNC: 103 MMOL/L — SIGNIFICANT CHANGE UP (ref 96–108)
CO2 SERPL-SCNC: 24 MMOL/L — SIGNIFICANT CHANGE UP (ref 22–29)
CREAT SERPL-MCNC: 0.51 MG/DL — SIGNIFICANT CHANGE UP (ref 0.5–1.3)
EGFR: 108 ML/MIN/1.73M2 — SIGNIFICANT CHANGE UP
GLUCOSE SERPL-MCNC: 91 MG/DL — SIGNIFICANT CHANGE UP (ref 70–99)
HCT VFR BLD CALC: 38.4 % — SIGNIFICANT CHANGE UP (ref 34.5–45)
HGB BLD-MCNC: 12.6 G/DL — SIGNIFICANT CHANGE UP (ref 11.5–15.5)
MCHC RBC-ENTMCNC: 29.8 PG — SIGNIFICANT CHANGE UP (ref 27–34)
MCHC RBC-ENTMCNC: 32.8 GM/DL — SIGNIFICANT CHANGE UP (ref 32–36)
MCV RBC AUTO: 90.8 FL — SIGNIFICANT CHANGE UP (ref 80–100)
PLATELET # BLD AUTO: 330 K/UL — SIGNIFICANT CHANGE UP (ref 150–400)
POTASSIUM SERPL-MCNC: 3.8 MMOL/L — SIGNIFICANT CHANGE UP (ref 3.5–5.3)
POTASSIUM SERPL-SCNC: 3.8 MMOL/L — SIGNIFICANT CHANGE UP (ref 3.5–5.3)
PROT SERPL-MCNC: 7.4 G/DL — SIGNIFICANT CHANGE UP (ref 6.6–8.7)
RBC # BLD: 4.23 M/UL — SIGNIFICANT CHANGE UP (ref 3.8–5.2)
RBC # FLD: 12 % — SIGNIFICANT CHANGE UP (ref 10.3–14.5)
SODIUM SERPL-SCNC: 137 MMOL/L — SIGNIFICANT CHANGE UP (ref 135–145)
WBC # BLD: 5.61 K/UL — SIGNIFICANT CHANGE UP (ref 3.8–10.5)
WBC # FLD AUTO: 5.61 K/UL — SIGNIFICANT CHANGE UP (ref 3.8–10.5)

## 2022-12-09 PROCEDURE — 36415 COLL VENOUS BLD VENIPUNCTURE: CPT

## 2022-12-09 PROCEDURE — 93971 EXTREMITY STUDY: CPT

## 2022-12-09 PROCEDURE — 93971 EXTREMITY STUDY: CPT | Mod: 26,RT

## 2022-12-09 PROCEDURE — 99284 EMERGENCY DEPT VISIT MOD MDM: CPT

## 2022-12-09 PROCEDURE — 85027 COMPLETE CBC AUTOMATED: CPT

## 2022-12-09 PROCEDURE — 80053 COMPREHEN METABOLIC PANEL: CPT

## 2022-12-09 NOTE — ED PROVIDER NOTE - SKIN, MLM
Skin normal color for race, warm, dry and intact. No evidence of rash. [facility]

## 2022-12-09 NOTE — ED PROVIDER NOTE - OBJECTIVE STATEMENT
58 y/o female with PMHx of HLD, Lumbar radiculopathy, and XIOMARA presents to ED c/o swelling of right leg. Pt had surgery 10/7, the next day had some right sided leg swelling, which resolved and is now swollen again. Pt also states she had fluid in her right knee; had effusion a few days ago. Pt now has swelling to entire right leg, including calves, knees and ankles. There is pain to the right leg is in medial aspect, states drainage was on lateral aspect. 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 Pt had negative doppler on 8/10.

## 2022-12-09 NOTE — ED ADULT NURSE NOTE - NS ED NURSE DISCH DISPOSITION
----- Message from Bryson Rodriguez NP sent at 8/26/2019  6:49 AM EDT -----  Inform pt that vitamin D level was low. A prescription for 50,000 units of Vitamin D has been sent to the pharmacy on file. Patient is to take once a week for 12 weeks. Patient will need to come in to have lab done in about 14 weeks. All other labs were normal.  Thanks! Discharged

## 2022-12-09 NOTE — ED PROVIDER NOTE - NSICDXPASTMEDICALHX_GEN_ALL_CORE_FT
PAST MEDICAL HISTORY:  Hyperlipidemia     Lower back pain     Lumbar radiculopathy     XIOMARA on CPAP

## 2022-12-09 NOTE — ED PROVIDER NOTE - PHYSICAL EXAMINATION
circumferences  L calf 16 1/4, R calf 16 3/4, L mid-thigh 22 5/8, R mid-thigh 22 3/6.  right LE larger on left no palpable cord, no signs of infection or abscess. good rom.

## 2022-12-09 NOTE — ED PROVIDER NOTE - NSICDXPASTSURGICALHX_GEN_ALL_CORE_FT
PAST SURGICAL HISTORY:  H/O  section x3    H/O lumbar discectomy 2020    History of ankle surgery right ankle, 3/2022    History of carpal tunnel surgery of left wrist     History of carpal tunnel surgery of right wrist 2021    History of cholecystectomy     History of colonoscopy     History of left hip replacement 2021    S/P partial hysterectomy

## 2022-12-09 NOTE — ED PROVIDER NOTE - NSFOLLOWUPINSTRUCTIONS_ED_ALL_ED_FT
-Please follow-up with your primary care doctor in the next 2 days.  Please call tomorrow for an appointment.  If you cannot follow-up with your primary care doctor please return to the ED for any urgent issues.  - You were given a copy of the tests performed today.  Please bring the results with you and review them with your primary care doctor.  - If you have any worsening of symptoms or any other concerns please return to the ED immediately.  - Please continue taking your home medications as directed.

## 2022-12-09 NOTE — ED PROVIDER NOTE - CLINICAL SUMMARY MEDICAL DECISION MAKING FREE TEXT BOX
Pt with intermittently re-occurring right LE swelling, recent surgeries and also had knee effusion a few days ago, obtain US, labs.

## 2022-12-09 NOTE — ED PROVIDER NOTE - MUSCULOSKELETAL, MLM
circumferences: L calf 16 1/4, R calf 16 3/4, L mid-thigh 22 5/8, R mid-thigh 22 3/6. Right LE larger on left, no palpable cord, no signs of infection or abscess. good ROM.

## 2022-12-09 NOTE — ED ADULT NURSE NOTE - HAVE YOU RECEIVED AT LEAST TWO PFIZER AND/OR MODERNA VACCINATIONS (IN ANY COMBINATION) AND/OR ONE JOHNSON & JOHNSON VACCINATION?
This message is to notify you that we recently sent a prescription refill request for the medication Humira for the attached patient and have not yet received a response. In order to avoid gaps in therapy, kindly take action on the refill request or send a new e-prescription to [id number].    If we should not expect a refill or if you have any questions, please feel free to reply-all to this message (make [facility] [place] is included as a recipient to your reply).      Thank you,        [facility]   [facility] Pool: [facility]   Email: [email or web address]  Phone: [phone]  
Yes

## 2022-12-09 NOTE — ED ADULT NURSE NOTE - HIV OFFER
Opt out Mildly to Moderately Impaired: difficulty hearing in some environments or speaker may need to increase volume or speak distinctly

## 2022-12-09 NOTE — ED PROVIDER NOTE - PATIENT PORTAL LINK FT
You can access the FollowMyHealth Patient Portal offered by [facility] by registering at the following website: [email or web address]. By joining [facility]’s FollowMyHealth portal, you will also be able to view your health information using other applications (apps) compatible with our system.

## 2023-01-09 ENCOUNTER — APPOINTMENT (OUTPATIENT)
Dept: RHEUMATOLOGY | Facility: CLINIC | Age: 58
End: 2023-01-09
Payer: MEDICARE

## 2023-01-09 VITALS — TEMPERATURE: 97.6 F | DIASTOLIC BLOOD PRESSURE: 84 MMHG | SYSTOLIC BLOOD PRESSURE: 116 MMHG | HEIGHT: 65 IN

## 2023-01-09 PROCEDURE — 99214 OFFICE O/P EST MOD 30 MIN: CPT

## 2023-01-11 ENCOUNTER — APPOINTMENT (OUTPATIENT)
Dept: ORTHOPEDIC SURGERY | Facility: CLINIC | Age: 58
End: 2023-01-11
Payer: OTHER MISCELLANEOUS

## 2023-01-11 VITALS — HEIGHT: 65 IN | WEIGHT: 206 LBS | BODY MASS INDEX: 34.32 KG/M2

## 2023-01-11 PROCEDURE — 99072 ADDL SUPL MATRL&STAF TM PHE: CPT

## 2023-01-11 PROCEDURE — 99213 OFFICE O/P EST LOW 20 MIN: CPT

## 2023-01-11 PROCEDURE — 95864 NEEDLE EMG 4 EXTREMITIES: CPT

## 2023-01-11 NOTE — HISTORY OF PRESENT ILLNESS
[Work related] : work related [Sudden] : sudden [Rest] : rest [Ice] : ice [Standing] : standing [Walking] : walking [Disabled] : Work status: disabled [4] : 4 [3] : 3 [Radiating] : radiating [Shooting] : shooting [de-identified] : Patient Complaint - WC 3/3/20 L Ankle and consequential R ankle pain. No prior\par Securing combative patient\par 10/21/20 PT/HEP helpful Not working\par 11/4/20 f/u R ankle Brace helpful, HEP,PT Not working\par 12/16/20202: f/u R ankle. Brace very helpful. Pt/hep. not working\par 2/10/21: f/u R ankle. Brace. hep. Not working\par 3/24/21 f/u R ankle HEP/Brace PT Not working\par 5/5/21: f/u R ankle. brace/hep/PT. Using cane for ambulation. Also having hip issues and waiting to get scheduled for\par THR by Dr. Schumacher Not working\par 6/30/21: f/u R ankle. brace/hep/PT. Using cane for ambulation. Not working. Awaiting scheduling for L THR.\par Ibuprofen/Naprosyn prn.\par 8/11/21: f/u R ankle. She had SHAHID on 7/26/21. Ambulation with cane\par 9/29/21 f/u R ankle PT/brace. Spine eval pending\par 11/10/21: f/u R ankle. Pt/brace. Disability/back issues, considering spine surgery\par 1/5/22: f/u R ankle. mri results. continued pain.\par 2/2/22: f/u R ankle. Pre-Op Peeroneal tendon not walking Pain mgt on Tramadol\par \par 3/16/22: Right Ankle Peroneal Tendon Repair\par \par 3/23/22: f/u R ankle. NWB in splint. Doing ok.\par 5/4/22 f/u R ankle  HEP/PT  had pinpoint drainge  -f/c  PT  Not working/Disability\par 8/10/22: f/u R ankle. Pt/hep. Numbness lateral foot\par 11/2/22: f/u R ankle. hep. Waiting for auth to continue physical therapy. Recently recovering from back surgery. still having pain and numbness. \par \par 1/11/23: f/u R ankle. Waiting for auth to continue therapy and for EMG. She states swelling has subsided, but she continues to have pain and numbness.  Still recovering from back surgery on 10/7/22.  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

## 2023-01-11 NOTE — PHYSICAL EXAM
[Right] : right foot and ankle [4___] : eversion 4[unfilled]/5 [2+] : dorsalis pedis pulse: 2+ [] : no calf tenderness [de-identified] : decreased 4/5 toes [de-identified] : plantar flexion 30 degrees [TWNoteComboBox7] : dorsiflexion 15 degrees

## 2023-02-04 NOTE — HISTORY OF PRESENT ILLNESS
[FreeTextEntry1] : Pt presenting today for a f.u visit for fibromyalgia. Last seen 09/2022. Chart reviewed since LV. \par Currently feeling well on gabapentin 300mg BID, duloxetine 40 mg daily. Tolerating medications well. Denies side effects. No acute complaints today. \par Denies fever, chills, CP, SOB, abd pain, oral ulcers, SICCA symptoms, blood in urine.

## 2023-02-16 ENCOUNTER — APPOINTMENT (OUTPATIENT)
Dept: ORTHOPEDIC SURGERY | Facility: CLINIC | Age: 58
End: 2023-02-16
Payer: OTHER MISCELLANEOUS

## 2023-02-16 VITALS — HEIGHT: 65 IN | WEIGHT: 198 LBS | BODY MASS INDEX: 32.99 KG/M2

## 2023-02-16 DIAGNOSIS — R60.0 LOCALIZED EDEMA: ICD-10-CM

## 2023-02-16 PROCEDURE — 99213 OFFICE O/P EST LOW 20 MIN: CPT

## 2023-02-16 PROCEDURE — 99072 ADDL SUPL MATRL&STAF TM PHE: CPT

## 2023-02-16 NOTE — HISTORY OF PRESENT ILLNESS
[6] : 6 [0] : 0 [Dull/Aching] : dull/aching [Localized] : localized [Tingling] : tingling [Disabled] : Work status: disabled [de-identified] : Patient Complaint - WC 3/3/20 L Ankle and consequential R ankle pain. No prior\par Securing combative patient\par 10/21/20 PT/HEP helpful Not working\par 11/4/20 f/u R ankle Brace helpful, HEP,PT Not working\par 12/16/20202: f/u R ankle. Brace very helpful. Pt/hep. not working\par 2/10/21: f/u R ankle. Brace. hep. Not working\par 3/24/21 f/u R ankle HEP/Brace PT Not working\par 5/5/21: f/u R ankle. brace/hep/PT. Using cane for ambulation. Also having hip issues and waiting to get scheduled for\par THR by Dr. Schumacher Not working\par 6/30/21: f/u R ankle. brace/hep/PT. Using cane for ambulation. Not working. Awaiting scheduling for L THR.\par Ibuprofen/Naprosyn prn.\par 8/11/21: f/u R ankle. She had SHAHID on 7/26/21. Ambulation with cane\par 9/29/21 f/u R ankle PT/brace. Spine eval pending\par 11/10/21: f/u R ankle. Pt/brace. Disability/back issues, considering spine surgery\par 1/5/22: f/u R ankle. mri results. continued pain.\par 2/2/22: f/u R ankle. Pre-Op Peeroneal tendon not walking Pain mgt on Tramadol\par \par 3/16/22: Right Ankle Peroneal Tendon Repair\par \par 3/23/22: f/u R ankle. NWB in splint. Doing ok.\par 5/4/22 f/u R ankle  HEP/PT  had pinpoint drainge  -f/c  PT  Not working/Disability\par 8/10/22: f/u R ankle. Pt/hep. Numbness lateral foot\par 11/2/22: f/u R ankle. hep. Waiting for auth to continue physical therapy. Recently recovering from back surgery. still having pain and numbness. \par \par 1/11/23: f/u R ankle. Waiting for auth to continue therapy and for EMG. She states swelling has subsided, but she continues to have pain and numbness.  Still recovering from back surgery on 10/7/22. \par 2/16/23  f/u R ankle  emg scheduled.  Had swelling after standing prolonged time  Not working [] : Post Surgical Visit: no [FreeTextEntry1] : R ankle [FreeTextEntry3] : 3/3/2020 [FreeTextEntry5] : 58 Y/O RHD F eval R ankle S/P work injury on above DOI pt states there has been a new onset of swelling and numbness on 2/1/22 due to overuse while at a wedding. 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

## 2023-02-16 NOTE — PHYSICAL EXAM
[Right] : right foot and ankle [4___] : eversion 4[unfilled]/5 [2+] : dorsalis pedis pulse: 2+ [] : no calf tenderness [de-identified] : decreased 4/5 toes, hyperalgesia [de-identified] : plantar flexion 30 degrees [TWNoteComboBox7] : dorsiflexion 15 degrees

## 2023-03-09 ENCOUNTER — FORM ENCOUNTER (OUTPATIENT)
Age: 58
End: 2023-03-09

## 2023-03-14 ENCOUNTER — APPOINTMENT (OUTPATIENT)
Dept: NEUROLOGY | Facility: CLINIC | Age: 58
End: 2023-03-14
Payer: OTHER MISCELLANEOUS

## 2023-03-14 PROCEDURE — 95912 NRV CNDJ TEST 11-12 STUDIES: CPT

## 2023-03-14 PROCEDURE — 95886 MUSC TEST DONE W/N TEST COMP: CPT

## 2023-03-14 PROCEDURE — 99072 ADDL SUPL MATRL&STAF TM PHE: CPT

## 2023-03-30 ENCOUNTER — APPOINTMENT (OUTPATIENT)
Dept: ORTHOPEDIC SURGERY | Facility: CLINIC | Age: 58
End: 2023-03-30
Payer: OTHER MISCELLANEOUS

## 2023-03-30 VITALS — BODY MASS INDEX: 32.99 KG/M2 | WEIGHT: 198 LBS | HEIGHT: 65 IN

## 2023-03-30 PROCEDURE — 99214 OFFICE O/P EST MOD 30 MIN: CPT

## 2023-03-30 NOTE — DATA REVIEWED
[Consistent with mononeuropathy] : consistent with mononeuropathy [Consistent with radiculopathy] : consistent with radiculopathy [FreeTextEntry1] : L5-S1 radic, sural neuropathy

## 2023-03-30 NOTE — PHYSICAL EXAM
[Right] : right foot and ankle [4___] : eversion 4[unfilled]/5 [2+] : dorsalis pedis pulse: 2+ [] : no calf tenderness [de-identified] : decreased 4/5 toes, hyperalgesia [de-identified] : plantar flexion 30 degrees [TWNoteComboBox7] : dorsiflexion 15 degrees

## 2023-03-30 NOTE — HISTORY OF PRESENT ILLNESS
[Dull/Aching] : dull/aching [6] : 6 [0] : 0 [Localized] : localized [Tingling] : tingling [Disabled] : Work status: disabled [de-identified] : Patient Complaint - WC 3/3/20 L Ankle and consequential R ankle pain. No prior\par Securing combative patient\par 10/21/20 PT/HEP helpful Not working\par 11/4/20 f/u R ankle Brace helpful, HEP,PT Not working\par 12/16/20202: f/u R ankle. Brace very helpful. Pt/hep. not working\par 2/10/21: f/u R ankle. Brace. hep. Not working\par 3/24/21 f/u R ankle HEP/Brace PT Not working\par 5/5/21: f/u R ankle. brace/hep/PT. Using cane for ambulation. Also having hip issues and waiting to get scheduled for\par THR by Dr. Schumacher Not working\par 6/30/21: f/u R ankle. brace/hep/PT. Using cane for ambulation. Not working. Awaiting scheduling for L THR.\par Ibuprofen/Naprosyn prn.\par 8/11/21: f/u R ankle. She had SHAHID on 7/26/21. Ambulation with cane\par 9/29/21 f/u R ankle PT/brace. Spine eval pending\par 11/10/21: f/u R ankle. Pt/brace. Disability/back issues, considering spine surgery\par 1/5/22: f/u R ankle. mri results. continued pain.\par 2/2/22: f/u R ankle. Pre-Op Peeroneal tendon not walking Pain mgt on Tramadol\par \par 3/16/22: Right Ankle Peroneal Tendon Repair\par \par 3/23/22: f/u R ankle. NWB in splint. Doing ok.\par 5/4/22 f/u R ankle  HEP/PT  had pinpoint drainge  -f/c  PT  Not working/Disability\par 8/10/22: f/u R ankle. Pt/hep. Numbness lateral foot\par 11/2/22: f/u R ankle. hep. Waiting for auth to continue physical therapy. Recently recovering from back surgery. still having pain and numbness. \par \par 1/11/23: f/u R ankle. Waiting for auth to continue therapy and for EMG. She states swelling has subsided, but she continues to have pain and numbness.  Still recovering from back surgery on 10/7/22. \par 2/16/23  f/u R ankle  emg scheduled.  Had swelling after standing prolonged time  Not working\par 3/30/23  f/u R ankle  back/knee issues  Disabled [] : Post Surgical Visit: no [FreeTextEntry1] : R ankle [FreeTextEntry3] : 3/3/2020 [FreeTextEntry5] : 56 Y/O RHD F eval R ankle S/P work injury on above DOI pt states there has been a new onset of swelling and numbness on 2/1/22 due to overuse while at a wedding. 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

## 2023-04-10 ENCOUNTER — APPOINTMENT (OUTPATIENT)
Dept: RHEUMATOLOGY | Facility: CLINIC | Age: 58
End: 2023-04-10
Payer: MEDICARE

## 2023-04-10 VITALS — TEMPERATURE: 98 F | OXYGEN SATURATION: 98 % | HEIGHT: 65 IN | RESPIRATION RATE: 17 BRPM | HEART RATE: 74 BPM

## 2023-04-10 PROCEDURE — 99214 OFFICE O/P EST MOD 30 MIN: CPT

## 2023-04-10 NOTE — PHYSICAL EXAM
[General Appearance - Alert] : alert [General Appearance - In No Acute Distress] : in no acute distress [General Appearance - Well Nourished] : well nourished [Sclera] : the sclera and conjunctiva were normal [General Appearance - Well Developed] : well developed [PERRL With Normal Accommodation] : pupils were equal in size, round, and reactive to light [Extraocular Movements] : extraocular movements were intact [Outer Ear] : the ears and nose were normal in appearance [Examination Of The Oral Cavity] : the lips and gums were normal [Oropharynx] : the oropharynx was normal [Neck Appearance] : the appearance of the neck was normal [Jugular Venous Distention Increased] : there was no jugular-venous distention [] : no respiratory distress [Respiration, Rhythm And Depth] : normal respiratory rhythm and effort [Auscultation Breath Sounds / Voice Sounds] : lungs were clear to auscultation bilaterally [Heart Rate And Rhythm] : heart rate was normal and rhythm regular [Heart Sounds] : normal S1 and S2 [Heart Sounds Gallop] : no gallops [Murmurs] : no murmurs [Heart Sounds Pericardial Friction Rub] : no pericardial rub [Bowel Sounds] : normal bowel sounds [Abdomen Soft] : soft [Abdomen Tenderness] : non-tender [No Spinal Tenderness] : no spinal tenderness [Abnormal Walk] : normal gait [Nail Clubbing] : no clubbing  or cyanosis of the fingernails [Involuntary Movements] : no involuntary movements were seen [Musculoskeletal - Swelling] : no joint swelling seen [Motor Tone] : muscle strength and tone were normal [No Focal Deficits] : no focal deficits [Oriented To Time, Place, And Person] : oriented to person, place, and time [FreeTextEntry1] : + diffuse soft tissue tenderness

## 2023-04-10 NOTE — ASSESSMENT
[FreeTextEntry1] : Fibromyalgia\par \par - labs as below\par - c/w gabapentin 300mg BID, duloxetine 40 mg daily\par - encouraged proper diet, good sleep hygiene, exercise, weight loss\par - MRI right knee- pending results. will follow. \par \par Discussed treatment plan with the patient. The patient was given the opportunity to ask questions and all questions were answered to their satisfaction.

## 2023-04-10 NOTE — HISTORY OF PRESENT ILLNESS
[FreeTextEntry1] : Pt presenting today for a f.u visit for fibromyalgia. Last seen 01/2023. Chart reviewed since LV. \par Currently feels her symptoms are stable on gabapentin 300mg BID, duloxetine 40 mg daily. Tolerating medications well. Denies side effects. No acute complaints today. \par Denies fever, chills, CP, SOB, abd pain, oral ulcers, SICCA symptoms, blood in urine.  No recent falls. \par Recently had MRI- right knee. pending results. \par No recent labs. Needs new labs.

## 2023-05-19 NOTE — ASU PREOP CHECKLIST - NS ASU TO WHOM HOLDING TO OR
OR RN Minoxidil Pregnancy And Lactation Text: This medication has not been assigned a Pregnancy Risk Category but animal studies failed to show danger with the topical medication. It is unknown if the medication is excreted in breast milk.

## 2023-05-24 ENCOUNTER — APPOINTMENT (OUTPATIENT)
Dept: ORTHOPEDIC SURGERY | Facility: CLINIC | Age: 58
End: 2023-05-24
Payer: OTHER MISCELLANEOUS

## 2023-05-24 VITALS — BODY MASS INDEX: 32.99 KG/M2 | WEIGHT: 198 LBS | HEIGHT: 65 IN

## 2023-05-24 DIAGNOSIS — M79.2 NEURALGIA AND NEURITIS, UNSPECIFIED: ICD-10-CM

## 2023-05-24 PROCEDURE — 99213 OFFICE O/P EST LOW 20 MIN: CPT

## 2023-05-24 NOTE — HISTORY OF PRESENT ILLNESS
[6] : 6 [0] : 0 [Dull/Aching] : dull/aching [Localized] : localized [Disabled] : Work status: disabled [Radiating] : radiating [Shooting] : shooting [Heat] : heat [de-identified] : Patient Complaint - WC 3/3/20 L Ankle and consequential R ankle pain. No prior\par Securing combative patient\par 10/21/20 PT/HEP helpful Not working\par 11/4/20 f/u R ankle Brace helpful, HEP,PT Not working\par 12/16/20202: f/u R ankle. Brace very helpful. Pt/hep. not working\par 2/10/21: f/u R ankle. Brace. hep. Not working\par 3/24/21 f/u R ankle HEP/Brace PT Not working\par 5/5/21: f/u R ankle. brace/hep/PT. Using cane for ambulation. Also having hip issues and waiting to get scheduled for\par THR by Dr. Schumacher Not working\par 6/30/21: f/u R ankle. brace/hep/PT. Using cane for ambulation. Not working. Awaiting scheduling for L THR.\par Ibuprofen/Naprosyn prn.\par 8/11/21: f/u R ankle. She had SHAHID on 7/26/21. Ambulation with cane\par 9/29/21 f/u R ankle PT/brace. Spine eval pending\par 11/10/21: f/u R ankle. Pt/brace. Disability/back issues, considering spine surgery\par 1/5/22: f/u R ankle. mri results. continued pain.\par 2/2/22: f/u R ankle. Pre-Op Peeroneal tendon not walking Pain mgt on Tramadol\par \par 3/16/22: Right Ankle Peroneal Tendon Repair\par \par 3/23/22: f/u R ankle. NWB in splint. Doing ok.\par 5/4/22 f/u R ankle  HEP/PT  had pinpoint drainge  -f/c  PT  Not working/Disability\par 8/10/22: f/u R ankle. Pt/hep. Numbness lateral foot\par 11/2/22: f/u R ankle. hep. Waiting for auth to continue physical therapy. Recently recovering from back surgery. still having pain and numbness. \par 1/11/23: f/u R ankle. Waiting for auth to continue therapy and for EMG. She states swelling has subsided, but she continues to have pain and numbness.  Still recovering from back surgery on 10/7/22. \par 2/16/23  f/u R ankle  emg scheduled.  Had swelling after standing prolonged time  Not working\par 3/30/23  f/u R ankle  back/knee issues  Disabled\par 5/24/23  f/u R ankle  numbness lateral foot.  Lidoderm patch not authorized  PT auth pending  Not working [] : Post Surgical Visit: no [FreeTextEntry1] : R ankle [FreeTextEntry3] : 3/3/2020 [FreeTextEntry5] : 56 Y/O RHD F eval R ankle S/P work injury on above DOI pt states there has been a new onset of swelling and numbness on 2/1/22 due to overuse while at a wedding. 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

## 2023-05-24 NOTE — PHYSICAL EXAM
[Right] : right foot and ankle [4___] : eversion 4[unfilled]/5 [2+] : dorsalis pedis pulse: 2+ [] : no calf tenderness [de-identified] : decreased 4/5 toes, hyperalgesia [de-identified] : plantar flexion 30 degrees [TWNoteComboBox7] : dorsiflexion 15 degrees

## 2023-07-12 LAB
ALBUMIN SERPL ELPH-MCNC: 4.4 G/DL
ALP BLD-CCNC: 87 U/L
ALT SERPL-CCNC: 24 U/L
ANION GAP SERPL CALC-SCNC: 14 MMOL/L
AST SERPL-CCNC: 17 U/L
BASOPHILS # BLD AUTO: 0.02 K/UL
BASOPHILS NFR BLD AUTO: 0.4 %
BILIRUB SERPL-MCNC: 0.7 MG/DL
BUN SERPL-MCNC: 14 MG/DL
CALCIUM SERPL-MCNC: 9.9 MG/DL
CCP AB SER IA-ACNC: <8 UNITS
CHLORIDE SERPL-SCNC: 103 MMOL/L
CK SERPL-CCNC: 69 U/L
CO2 SERPL-SCNC: 23 MMOL/L
CREAT SERPL-MCNC: 0.7 MG/DL
CRP SERPL-MCNC: <3 MG/L
EGFR: 100 ML/MIN/1.73M2
EOSINOPHIL # BLD AUTO: 0.13 K/UL
EOSINOPHIL NFR BLD AUTO: 2.4 %
ERYTHROCYTE [SEDIMENTATION RATE] IN BLOOD BY WESTERGREN METHOD: 25 MM/HR
GLUCOSE SERPL-MCNC: 81 MG/DL
HCT VFR BLD CALC: 42.4 %
HGB BLD-MCNC: 14 G/DL
IMM GRANULOCYTES NFR BLD AUTO: 0.2 %
LYMPHOCYTES # BLD AUTO: 1.91 K/UL
LYMPHOCYTES NFR BLD AUTO: 35.6 %
MAN DIFF?: NORMAL
MCHC RBC-ENTMCNC: 31.1 PG
MCHC RBC-ENTMCNC: 33 GM/DL
MCV RBC AUTO: 94.2 FL
MONOCYTES # BLD AUTO: 0.43 K/UL
MONOCYTES NFR BLD AUTO: 8 %
NEUTROPHILS # BLD AUTO: 2.87 K/UL
NEUTROPHILS NFR BLD AUTO: 53.4 %
PLATELET # BLD AUTO: 298 K/UL
POTASSIUM SERPL-SCNC: 4.3 MMOL/L
PROT SERPL-MCNC: 6.8 G/DL
RBC # BLD: 4.5 M/UL
RBC # FLD: 13.4 %
RF+CCP IGG SER-IMP: NEGATIVE
RHEUMATOID FACT SER QL: 11 IU/ML
SODIUM SERPL-SCNC: 140 MMOL/L
TSH SERPL-ACNC: 0.63 UIU/ML
WBC # FLD AUTO: 5.37 K/UL

## 2023-08-22 ENCOUNTER — APPOINTMENT (OUTPATIENT)
Dept: RHEUMATOLOGY | Facility: CLINIC | Age: 58
End: 2023-08-22
Payer: MEDICARE

## 2023-08-22 VITALS
TEMPERATURE: 97.9 F | OXYGEN SATURATION: 98 % | HEIGHT: 65 IN | WEIGHT: 186 LBS | DIASTOLIC BLOOD PRESSURE: 80 MMHG | BODY MASS INDEX: 30.99 KG/M2 | RESPIRATION RATE: 17 BRPM | HEART RATE: 73 BPM | SYSTOLIC BLOOD PRESSURE: 120 MMHG

## 2023-08-22 PROCEDURE — 99214 OFFICE O/P EST MOD 30 MIN: CPT

## 2023-08-22 NOTE — HISTORY OF PRESENT ILLNESS
[FreeTextEntry1] : Pt presenting today for a f.u visit for fibromyalgia. Last seen 04/2023. Chart reviewed since LV.  Currently feels her symptoms are stable on gabapentin 300mg BID, duloxetine 40 mg daily. Tolerating medications well. Denies side effects. No acute complaints today.  Denies fever, chills, CP, SOB, abd pain, oral ulcers, SICCA symptoms, blood in urine.  No recent falls. ROS otherwise unchanged from .

## 2023-08-22 NOTE — ASSESSMENT
[FreeTextEntry1] : Fibromyalgia  - labs prior to follow up - c/w gabapentin 300mg BID, duloxetine 40 mg daily - encouraged proper diet, good sleep hygiene, exercise, weight loss  Discussed treatment plan with the patient. The patient was given the opportunity to ask questions and all questions were answered to their satisfaction.

## 2023-08-30 ENCOUNTER — APPOINTMENT (OUTPATIENT)
Dept: ORTHOPEDIC SURGERY | Facility: CLINIC | Age: 58
End: 2023-08-30
Payer: OTHER MISCELLANEOUS

## 2023-08-30 VITALS — WEIGHT: 186 LBS | BODY MASS INDEX: 30.99 KG/M2 | HEIGHT: 65 IN

## 2023-08-30 PROCEDURE — 99213 OFFICE O/P EST LOW 20 MIN: CPT

## 2023-08-30 NOTE — PHYSICAL EXAM
[Right] : right foot and ankle [4___] : eversion 4[unfilled]/5 [2+] : dorsalis pedis pulse: 2+ [] : no calf tenderness [de-identified] : decreased 4/5 toes, hyperalgesia [de-identified] : plantar flexion 30 degrees [TWNoteComboBox7] : dorsiflexion 15 degrees

## 2023-08-30 NOTE — WORK
[Total (100%)] : total (100%) [Cannot return to work because ________] : cannot return to work because [unfilled] [FreeTextEntry1] : poor

## 2023-08-30 NOTE — HISTORY OF PRESENT ILLNESS
[Work related] : work related [4] : 4 [3] : 3 [Dull/Aching] : dull/aching [Localized] : localized [Radiating] : radiating [Shooting] : shooting [Heat] : heat [Standing] : standing [Disabled] : Work status: disabled [de-identified] : Patient Complaint - WC 3/3/20 L Ankle and consequential R ankle pain. No prior Securing combative patient 10/21/20 PT/HEP helpful Not working 11/4/20 f/u R ankle Brace helpful, HEP,PT Not working 12/16/20202: f/u R ankle. Brace very helpful. Pt/hep. not working 2/10/21: f/u R ankle. Brace. hep. Not working 3/24/21 f/u R ankle HEP/Brace PT Not working 5/5/21: f/u R ankle. brace/hep/PT. Using cane for ambulation. Also having hip issues and waiting to get scheduled for THR by Dr. Schumacher Not working 6/30/21: f/u R ankle. brace/hep/PT. Using cane for ambulation. Not working. Awaiting scheduling for L THR. Ibuprofen/Naprosyn prn. 8/11/21: f/u R ankle. She had SHAHID on 7/26/21. Ambulation with cane 9/29/21 f/u R ankle PT/brace. Spine eval pending 11/10/21: f/u R ankle. Pt/brace. Disability/back issues, considering spine surgery 1/5/22: f/u R ankle. mri results. continued pain. 2/2/22: f/u R ankle. Pre-Op Peeroneal tendon not walking Pain mgt on Tramadol  3/16/22: Right Ankle Peroneal Tendon Repair  3/23/22: f/u R ankle. NWB in splint. Doing ok. 5/4/22 f/u R ankle  HEP/PT  had pinpoint drainge  -f/c  PT  Not working/Disability 8/10/22: f/u R ankle. Pt/hep. Numbness lateral foot 11/2/22: f/u R ankle. hep. Waiting for auth to continue physical therapy. Recently recovering from back surgery. still having pain and numbness.  1/11/23: f/u R ankle. Waiting for auth to continue therapy and for EMG. She states swelling has subsided, but she continues to have pain and numbness.  Still recovering from back surgery on 10/7/22.  2/16/23  f/u R ankle  emg scheduled.  Had swelling after standing prolonged time  Not working 3/30/23  f/u R ankle  back/knee issues  Disabled 5/24/23  f/u R ankle  numbness lateral foot.  Lidoderm patch not authorized  PT auth pending  Not working 8/30/23  f/u R ankle  Using lateral wedge L hip pain too.  Numbness persists lateral r foot [] : Post Surgical Visit: no [FreeTextEntry1] : R ankle [FreeTextEntry3] : 3/3/2020 [FreeTextEntry5] : 58 Y/O RHD F eval R ankle S/P work injury on above DOI pt states there has been a new onset of swelling and numbness on 2/1/22 due to overuse while at a wedding. 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

## 2023-10-30 ENCOUNTER — RX RENEWAL (OUTPATIENT)
Age: 58
End: 2023-10-30

## 2023-11-06 ENCOUNTER — RX RENEWAL (OUTPATIENT)
Age: 58
End: 2023-11-06

## 2023-11-06 RX ORDER — ERGOCALCIFEROL 1.25 MG/1
1.25 MG CAPSULE, LIQUID FILLED ORAL
Qty: 12 | Refills: 0 | Status: ACTIVE | COMMUNITY
Start: 2020-09-14 | End: 1900-01-01

## 2023-11-30 ENCOUNTER — APPOINTMENT (OUTPATIENT)
Dept: ORTHOPEDIC SURGERY | Facility: CLINIC | Age: 58
End: 2023-11-30
Payer: OTHER MISCELLANEOUS

## 2023-11-30 PROCEDURE — 99213 OFFICE O/P EST LOW 20 MIN: CPT

## 2023-12-15 ENCOUNTER — TRANSCRIPTION ENCOUNTER (OUTPATIENT)
Age: 58
End: 2023-12-15

## 2024-01-30 ENCOUNTER — APPOINTMENT (OUTPATIENT)
Dept: RHEUMATOLOGY | Facility: CLINIC | Age: 59
End: 2024-01-30
Payer: MEDICARE

## 2024-01-30 VITALS
DIASTOLIC BLOOD PRESSURE: 90 MMHG | OXYGEN SATURATION: 99 % | SYSTOLIC BLOOD PRESSURE: 126 MMHG | HEIGHT: 65 IN | WEIGHT: 190 LBS | BODY MASS INDEX: 31.65 KG/M2 | HEART RATE: 74 BPM | RESPIRATION RATE: 17 BRPM

## 2024-01-30 DIAGNOSIS — M54.50 LOW BACK PAIN, UNSPECIFIED: ICD-10-CM

## 2024-01-30 PROCEDURE — G2211 COMPLEX E/M VISIT ADD ON: CPT

## 2024-01-30 PROCEDURE — 99214 OFFICE O/P EST MOD 30 MIN: CPT

## 2024-01-30 RX ORDER — GABAPENTIN 300 MG/1
300 CAPSULE ORAL TWICE DAILY
Qty: 60 | Refills: 3 | Status: ACTIVE | COMMUNITY
Start: 1900-01-01 | End: 1900-01-01

## 2024-01-30 RX ORDER — DULOXETINE HYDROCHLORIDE 20 MG/1
20 CAPSULE, DELAYED RELEASE PELLETS ORAL
Qty: 60 | Refills: 3 | Status: ACTIVE | COMMUNITY
Start: 2021-03-23 | End: 1900-01-01

## 2024-01-30 NOTE — HISTORY OF PRESENT ILLNESS
[FreeTextEntry1] : Pt presenting today for a f.u visit for fibromyalgia. Last seen 08/2023. Chart reviewed since LV.  Currently feels her symptoms are stable on gabapentin 300mg BID, duloxetine 40 mg daily. Tolerating medications well. Denies side effects. No acute complaints today.  Denies fever, chills, CP, SOB, abd pain, oral ulcers, SICCA symptoms, blood in urine.  No recent falls. ROS otherwise unchanged from .

## 2024-02-02 LAB
M TB IFN-G BLD-IMP: NEGATIVE
QUANTIFERON TB PLUS MITOGEN MINUS NIL: 5.72 IU/ML
QUANTIFERON TB PLUS NIL: 0.01 IU/ML
QUANTIFERON TB PLUS TB1 MINUS NIL: 0.04 IU/ML
QUANTIFERON TB PLUS TB2 MINUS NIL: 0.03 IU/ML

## 2024-03-27 ENCOUNTER — APPOINTMENT (OUTPATIENT)
Dept: ORTHOPEDIC SURGERY | Facility: CLINIC | Age: 59
End: 2024-03-27
Payer: OTHER MISCELLANEOUS

## 2024-03-27 ENCOUNTER — APPOINTMENT (OUTPATIENT)
Dept: ORTHOPEDIC SURGERY | Facility: CLINIC | Age: 59
End: 2024-03-27

## 2024-03-27 VITALS — BODY MASS INDEX: 31.65 KG/M2 | HEIGHT: 65 IN | WEIGHT: 190 LBS

## 2024-03-27 DIAGNOSIS — M54.17 RADICULOPATHY, LUMBOSACRAL REGION: ICD-10-CM

## 2024-03-27 DIAGNOSIS — G57.31 LESION OF LATERAL POPLITEAL NERVE, RIGHT LOWER LIMB: ICD-10-CM

## 2024-03-27 PROCEDURE — 99213 OFFICE O/P EST LOW 20 MIN: CPT | Mod: ACP

## 2024-03-27 NOTE — HISTORY OF PRESENT ILLNESS
[Work related] : work related [4] : 4 [Dull/Aching] : dull/aching [Localized] : localized [Radiating] : radiating [Shooting] : shooting [Heat] : heat [Standing] : standing [Disabled] : Work status: disabled [de-identified] : Patient Complaint - WC 3/3/20 L Ankle and consequential R ankle pain. No prior Securing combative patient 10/21/20 PT/HEP helpful Not working 11/4/20 f/u R ankle Brace helpful, HEP,PT Not working 12/16/20202: f/u R ankle. Brace very helpful. Pt/hep. not working 2/10/21: f/u R ankle. Brace. hep. Not working 3/24/21 f/u R ankle HEP/Brace PT Not working 5/5/21: f/u R ankle. brace/hep/PT. Using cane for ambulation. Also having hip issues and waiting to get scheduled for THR by Dr. Schumacher Not working 6/30/21: f/u R ankle. brace/hep/PT. Using cane for ambulation. Not working. Awaiting scheduling for L THR. Ibuprofen/Naprosyn prn. 8/11/21: f/u R ankle. She had SHAHID on 7/26/21. Ambulation with cane 9/29/21 f/u R ankle PT/brace. Spine eval pending 11/10/21: f/u R ankle. Pt/brace. Disability/back issues, considering spine surgery 1/5/22: f/u R ankle. mri results. continued pain. 2/2/22: f/u R ankle. Pre-Op Peeroneal tendon not walking Pain mgt on Tramadol  3/16/22: Right Ankle Peroneal Tendon Repair  3/23/22: f/u R ankle. NWB in splint. Doing ok. 5/4/22 f/u R ankle  HEP/PT  had pinpoint drainge  -f/c  PT  Not working/Disability 8/10/22: f/u R ankle. Pt/hep. Numbness lateral foot 11/2/22: f/u R ankle. hep. Waiting for auth to continue physical therapy. Recently recovering from back surgery. still having pain and numbness.  1/11/23: f/u R ankle. Waiting for auth to continue therapy and for EMG. She states swelling has subsided, but she continues to have pain and numbness.  Still recovering from back surgery on 10/7/22.  2/16/23  f/u R ankle  emg scheduled.  Had swelling after standing prolonged time  Not working 3/30/23  f/u R ankle  back/knee issues  Disabled 5/24/23  f/u R ankle  numbness lateral foot.  Lidoderm patch not authorized  PT auth pending  Not working 8/30/23  f/u R ankle  Using lateral wedge L hip pain too.  Numbness persists lateral r foot 11/30/23  f/u R ankle  HEP, using patches  Numbness persists  Not working 3/27/24: f/u R ankle. HEP. Numbness persists. No working.  [] : Post Surgical Visit: no [FreeTextEntry1] : R ankle [FreeTextEntry3] : 3/3/2020 [FreeTextEntry5] : 58 Y/O RHD F eval R ankle S/P work injury on above DOI pt states there has been a new onset of swelling and numbness on 2/1/22 due to overuse while at a wedding. 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

## 2024-03-27 NOTE — PHYSICAL EXAM
[Right] : right foot and ankle [NL (40)] : plantar flexion 40 degrees [5___] : eversion 5[unfilled]/5 [1+] : dorsalis pedis pulse: 1+ [] : no peroneal tendon tenderness [de-identified] : decreased lateral forefoot [de-identified] : altered due to knee, limb length [TWNoteComboBox7] : dorsiflexion 15 degrees

## 2024-06-05 ENCOUNTER — APPOINTMENT (OUTPATIENT)
Dept: ORTHOPEDIC SURGERY | Facility: CLINIC | Age: 59
End: 2024-06-05
Payer: OTHER MISCELLANEOUS

## 2024-06-05 ENCOUNTER — APPOINTMENT (OUTPATIENT)
Dept: RHEUMATOLOGY | Facility: CLINIC | Age: 59
End: 2024-06-05

## 2024-06-05 VITALS
OXYGEN SATURATION: 95 % | DIASTOLIC BLOOD PRESSURE: 80 MMHG | WEIGHT: 199 LBS | BODY MASS INDEX: 33.15 KG/M2 | TEMPERATURE: 96.7 F | HEIGHT: 65 IN | HEART RATE: 78 BPM | SYSTOLIC BLOOD PRESSURE: 100 MMHG

## 2024-06-05 VITALS — BODY MASS INDEX: 31.65 KG/M2 | WEIGHT: 190 LBS | HEIGHT: 65 IN

## 2024-06-05 DIAGNOSIS — S86.301D UNSPECIFIED INJURY OF MUSCLE(S) AND TENDON(S) OF PERONEAL MUSCLE GROUP AT LOWER LEG LEVEL, RIGHT LEG, SUBSEQUENT ENCOUNTER: ICD-10-CM

## 2024-06-05 DIAGNOSIS — R06.02 SHORTNESS OF BREATH: ICD-10-CM

## 2024-06-05 DIAGNOSIS — M79.10 MYALGIA, UNSPECIFIED SITE: ICD-10-CM

## 2024-06-05 DIAGNOSIS — M25.50 PAIN IN UNSPECIFIED JOINT: ICD-10-CM

## 2024-06-05 DIAGNOSIS — M79.7 FIBROMYALGIA: ICD-10-CM

## 2024-06-05 DIAGNOSIS — R20.0 ANESTHESIA OF SKIN: ICD-10-CM

## 2024-06-05 DIAGNOSIS — R20.2 ANESTHESIA OF SKIN: ICD-10-CM

## 2024-06-05 DIAGNOSIS — R53.82 CHRONIC FATIGUE, UNSPECIFIED: ICD-10-CM

## 2024-06-05 PROCEDURE — 99213 OFFICE O/P EST LOW 20 MIN: CPT

## 2024-06-05 PROCEDURE — 99214 OFFICE O/P EST MOD 30 MIN: CPT

## 2024-06-05 PROCEDURE — G2211 COMPLEX E/M VISIT ADD ON: CPT

## 2024-06-05 RX ORDER — LIDOCAINE 5% 700 MG/1
5 PATCH TOPICAL
Qty: 30 | Refills: 2 | Status: DISCONTINUED | COMMUNITY
Start: 2023-03-30 | End: 2024-06-05

## 2024-06-05 RX ORDER — TRAMADOL HYDROCHLORIDE 25 MG/1
TABLET, COATED ORAL
Refills: 0 | Status: ACTIVE | COMMUNITY

## 2024-06-05 RX ORDER — LIDOCAINE 5% 700 MG/1
5 PATCH TOPICAL
Qty: 30 | Refills: 2 | Status: DISCONTINUED | COMMUNITY
Start: 2023-05-24 | End: 2024-06-05

## 2024-06-05 RX ORDER — FLUTICASONE PROPIONATE AND SALMETEROL XINAFOATE 115; 21 UG/1; UG/1
115-21 AEROSOL, METERED RESPIRATORY (INHALATION)
Refills: 0 | Status: ACTIVE | COMMUNITY

## 2024-06-05 RX ORDER — PHENTERMINE HYDROCHLORIDE 37.5 MG/1
TABLET ORAL
Refills: 0 | Status: ACTIVE | COMMUNITY

## 2024-06-05 NOTE — PHYSICAL EXAM
[General Appearance - Alert] : alert [General Appearance - In No Acute Distress] : in no acute distress [General Appearance - Well Nourished] : well nourished [General Appearance - Well Developed] : well developed [Sclera] : the sclera and conjunctiva were normal [PERRL With Normal Accommodation] : pupils were equal in size, round, and reactive to light [Extraocular Movements] : extraocular movements were intact [Outer Ear] : the ears and nose were normal in appearance [Examination Of The Oral Cavity] : the lips and gums were normal [Oropharynx] : the oropharynx was normal [Neck Appearance] : the appearance of the neck was normal [Jugular Venous Distention Increased] : there was no jugular-venous distention [] : no respiratory distress [Respiration, Rhythm And Depth] : normal respiratory rhythm and effort [Auscultation Breath Sounds / Voice Sounds] : lungs were clear to auscultation bilaterally [Heart Rate And Rhythm] : heart rate was normal and rhythm regular [Heart Sounds] : normal S1 and S2 [Heart Sounds Gallop] : no gallops [Murmurs] : no murmurs [Heart Sounds Pericardial Friction Rub] : no pericardial rub [Bowel Sounds] : normal bowel sounds [Abdomen Soft] : soft [Abdomen Tenderness] : non-tender [No Spinal Tenderness] : no spinal tenderness [No Focal Deficits] : no focal deficits [Oriented To Time, Place, And Person] : oriented to person, place, and time [Abnormal Walk] : normal gait [Nail Clubbing] : no clubbing  or cyanosis of the fingernails [Involuntary Movements] : no involuntary movements were seen [Musculoskeletal - Swelling] : no joint swelling seen [Motor Tone] : muscle strength and tone were normal [FreeTextEntry1] : + diffuse soft tissue tenderness

## 2024-06-05 NOTE — HISTORY OF PRESENT ILLNESS
[Work related] : work related [Dull/Aching] : dull/aching [Localized] : localized [Radiating] : radiating [Shooting] : shooting [Heat] : heat [Disabled] : Work status: disabled [6] : 6 [5] : 5 [de-identified] : Patient Complaint - WC 3/3/20 L Ankle and consequential R ankle pain. No prior Securing combative patient 10/21/20 PT/HEP helpful Not working 11/4/20 f/u R ankle Brace helpful, HEP,PT Not working 12/16/20202: f/u R ankle. Brace very helpful. Pt/hep. not working 2/10/21: f/u R ankle. Brace. hep. Not working 3/24/21 f/u R ankle HEP/Brace PT Not working 5/5/21: f/u R ankle. brace/hep/PT. Using cane for ambulation. Also having hip issues and waiting to get scheduled for THR by Dr. Schumacher Not working 6/30/21: f/u R ankle. brace/hep/PT. Using cane for ambulation. Not working. Awaiting scheduling for L THR. Ibuprofen/Naprosyn prn. 8/11/21: f/u R ankle. She had SHAHID on 7/26/21. Ambulation with cane 9/29/21 f/u R ankle PT/brace. Spine eval pending 11/10/21: f/u R ankle. Pt/brace. Disability/back issues, considering spine surgery 1/5/22: f/u R ankle. mri results. continued pain. 2/2/22: f/u R ankle. Pre-Op Peeroneal tendon not walking Pain mgt on Tramadol  3/16/22: Right Ankle Peroneal Tendon Repair  3/23/22: f/u R ankle. NWB in splint. Doing ok. 5/4/22 f/u R ankle  HEP/PT  had pinpoint drainge  -f/c  PT  Not working/Disability 8/10/22: f/u R ankle. Pt/hep. Numbness lateral foot 11/2/22: f/u R ankle. hep. Waiting for auth to continue physical therapy. Recently recovering from back surgery. still having pain and numbness.  1/11/23: f/u R ankle. Waiting for auth to continue therapy and for EMG. She states swelling has subsided, but she continues to have pain and numbness.  Still recovering from back surgery on 10/7/22.  2/16/23  f/u R ankle  emg scheduled.  Had swelling after standing prolonged time  Not working 3/30/23  f/u R ankle  back/knee issues  Disabled 5/24/23  f/u R ankle  numbness lateral foot.  Lidoderm patch not authorized  PT auth pending  Not working 8/30/23  f/u R ankle  Using lateral wedge L hip pain too.  Numbness persists lateral r foot 11/30/23  f/u R ankle  HEP, using patches  Numbness persists  Not working 3/27/24: f/u R ankle. HEP. Numbness persists. Not working. 6/5/24  f/u R ankle  HEP  Pain mgt Spinal chord stimulator/Gabapentin/Tramadol  [] : Post Surgical Visit: no [FreeTextEntry1] : R ankle [FreeTextEntry3] : 3/3/2020 [FreeTextEntry5] : 56 Y/O RHD F eval R ankle S/P work injury on above DOI pt states there has been a new onset of swelling and numbness on 2/1/22 due to overuse while at a wedding. 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

## 2024-06-05 NOTE — PHYSICAL EXAM
[Right] : right foot and ankle [NL (40)] : plantar flexion 40 degrees [5___] : eversion 5[unfilled]/5 [1+] : dorsalis pedis pulse: 1+ [] : patient ambulates without assistive device [de-identified] : decreased lateral forefoot [de-identified] : altered due to knee, limb length [TWNoteComboBox7] : dorsiflexion 15 degrees

## 2024-06-06 LAB
ALBUMIN SERPL ELPH-MCNC: 4.1 G/DL
ALP BLD-CCNC: 77 U/L
ALT SERPL-CCNC: 16 U/L
ANION GAP SERPL CALC-SCNC: 13 MMOL/L
APPEARANCE: CLEAR
AST SERPL-CCNC: 16 U/L
BASOPHILS # BLD AUTO: 0.02 K/UL
BASOPHILS NFR BLD AUTO: 0.3 %
BILIRUB SERPL-MCNC: 0.4 MG/DL
BILIRUBIN URINE: NEGATIVE
BLOOD URINE: NEGATIVE
BUN SERPL-MCNC: 19 MG/DL
C3 SERPL-MCNC: 153 MG/DL
C4 SERPL-MCNC: 33 MG/DL
CALCIUM SERPL-MCNC: 9.1 MG/DL
CENTROMERE IGG SER-ACNC: <0.2 AL
CHLORIDE SERPL-SCNC: 105 MMOL/L
CHROMATIN AB SERPL-ACNC: <0.2 AL
CK SERPL-CCNC: 115 U/L
CO2 SERPL-SCNC: 22 MMOL/L
COLOR: YELLOW
CREAT SERPL-MCNC: 0.78 MG/DL
CREAT SPEC-SCNC: 169 MG/DL
CREAT/PROT UR: 0.1 RATIO
CRP SERPL-MCNC: 6 MG/L
DSDNA AB SER-ACNC: <1 IU/ML
EGFR: 87 ML/MIN/1.73M2
ENA RNP AB SER IA-ACNC: 0.8 AL
ENA RNP AB SER IA-ACNC: 0.8 AL
ENA SM AB SER IA-ACNC: <0.2 AL
ENA SS-A AB SER IA-ACNC: <0.2 AL
ENA SS-B AB SER IA-ACNC: <0.2 AL
EOSINOPHIL # BLD AUTO: 0.43 K/UL
EOSINOPHIL NFR BLD AUTO: 7.2 %
GLUCOSE QUALITATIVE U: NEGATIVE MG/DL
GLUCOSE SERPL-MCNC: 92 MG/DL
HCT VFR BLD CALC: 38.2 %
HGB BLD-MCNC: 12.8 G/DL
IMM GRANULOCYTES NFR BLD AUTO: 0.2 %
KETONES URINE: NEGATIVE MG/DL
LEUKOCYTE ESTERASE URINE: NEGATIVE
LYMPHOCYTES # BLD AUTO: 2.36 K/UL
LYMPHOCYTES NFR BLD AUTO: 39.3 %
MAN DIFF?: NORMAL
MCHC RBC-ENTMCNC: 31.4 PG
MCHC RBC-ENTMCNC: 33.5 GM/DL
MCV RBC AUTO: 93.6 FL
MONOCYTES # BLD AUTO: 0.46 K/UL
MONOCYTES NFR BLD AUTO: 7.7 %
NEUTROPHILS # BLD AUTO: 2.72 K/UL
NEUTROPHILS NFR BLD AUTO: 45.3 %
NITRITE URINE: NEGATIVE
PH URINE: 5.5
PLATELET # BLD AUTO: 260 K/UL
POTASSIUM SERPL-SCNC: 4.2 MMOL/L
PROT SERPL-MCNC: 6.6 G/DL
PROT UR-MCNC: 12 MG/DL
PROTEIN URINE: NEGATIVE MG/DL
RBC # BLD: 4.08 M/UL
RBC # FLD: 12.7 %
RHEUMATOID FACT SER QL: <10 IU/ML
SODIUM SERPL-SCNC: 140 MMOL/L
SPECIFIC GRAVITY URINE: >1.03
THYROGLOB AB SERPL-ACNC: <20 IU/ML
THYROPEROXIDASE AB SERPL IA-ACNC: <10 IU/ML
TSH SERPL-ACNC: 0.81 UIU/ML
UROBILINOGEN URINE: 0.2 MG/DL
WBC # FLD AUTO: 6 K/UL

## 2024-06-07 LAB
ANA SER IF-ACNC: NEGATIVE
CCP AB SER IA-ACNC: <8 UNITS
RF+CCP IGG SER-IMP: NEGATIVE

## 2024-08-05 ENCOUNTER — APPOINTMENT (OUTPATIENT)
Dept: PULMONOLOGY | Facility: CLINIC | Age: 59
End: 2024-08-05

## 2024-08-28 ENCOUNTER — APPOINTMENT (OUTPATIENT)
Dept: RHEUMATOLOGY | Facility: CLINIC | Age: 59
End: 2024-08-28

## 2024-08-28 ENCOUNTER — APPOINTMENT (OUTPATIENT)
Dept: ORTHOPEDIC SURGERY | Facility: CLINIC | Age: 59
End: 2024-08-28
Payer: OTHER MISCELLANEOUS

## 2024-08-28 VITALS
DIASTOLIC BLOOD PRESSURE: 88 MMHG | WEIGHT: 199 LBS | SYSTOLIC BLOOD PRESSURE: 134 MMHG | BODY MASS INDEX: 33.15 KG/M2 | RESPIRATION RATE: 17 BRPM | OXYGEN SATURATION: 97 % | TEMPERATURE: 97.8 F | HEART RATE: 84 BPM | HEIGHT: 65 IN

## 2024-08-28 VITALS — BODY MASS INDEX: 33.15 KG/M2 | WEIGHT: 199 LBS | HEIGHT: 65 IN

## 2024-08-28 DIAGNOSIS — R20.2 ANESTHESIA OF SKIN: ICD-10-CM

## 2024-08-28 DIAGNOSIS — R20.0 ANESTHESIA OF SKIN: ICD-10-CM

## 2024-08-28 DIAGNOSIS — S86.301D UNSPECIFIED INJURY OF MUSCLE(S) AND TENDON(S) OF PERONEAL MUSCLE GROUP AT LOWER LEG LEVEL, RIGHT LEG, SUBSEQUENT ENCOUNTER: ICD-10-CM

## 2024-08-28 PROCEDURE — 99213 OFFICE O/P EST LOW 20 MIN: CPT

## 2024-08-28 PROCEDURE — 99214 OFFICE O/P EST MOD 30 MIN: CPT

## 2024-08-28 PROCEDURE — G2211 COMPLEX E/M VISIT ADD ON: CPT

## 2024-08-28 NOTE — PHYSICAL EXAM
[Right] : right foot and ankle [NL (40)] : plantar flexion 40 degrees [5___] : eversion 5[unfilled]/5 [1+] : dorsalis pedis pulse: 1+ [] : no peroneal tendon tenderness [de-identified] : decreased lateral forefoot [de-identified] : altered due to knee, limb length [TWNoteComboBox7] : dorsiflexion 15 degrees

## 2024-08-28 NOTE — ASSESSMENT
[FreeTextEntry1] : Fibromyalgia Current symptoms stable  - repeat labs - c/w gabapentin 300mg BID, duloxetine 40 mg daily - reviewed risk and benefits of medications - encouraged proper diet, good sleep hygiene, exercise, weight loss  Discussed treatment plan with the patient. The patient was given the opportunity to ask questions and all questions were answered to their satisfaction.

## 2024-08-28 NOTE — DISCUSSION/SUMMARY
[de-identified] : Cont HEP Brace prn compression stockings Pain mgt  She is planned for knee surgery October

## 2024-08-28 NOTE — HISTORY OF PRESENT ILLNESS
[Work related] : work related [Dull/Aching] : dull/aching [Localized] : localized [Radiating] : radiating [Shooting] : shooting [Heat] : heat [Disabled] : Work status: disabled [4] : 4 [Meds] : meds [de-identified] : Patient Complaint - WC 3/3/20 L Ankle and consequential R ankle pain. No prior Securing combative patient 10/21/20 PT/HEP helpful Not working 11/4/20 f/u R ankle Brace helpful, HEP,PT Not working 12/16/20202: f/u R ankle. Brace very helpful. Pt/hep. not working 2/10/21: f/u R ankle. Brace. hep. Not working 3/24/21 f/u R ankle HEP/Brace PT Not working 5/5/21: f/u R ankle. brace/hep/PT. Using cane for ambulation. Also having hip issues and waiting to get scheduled for THR by Dr. Schumacher Not working 6/30/21: f/u R ankle. brace/hep/PT. Using cane for ambulation. Not working. Awaiting scheduling for L THR. Ibuprofen/Naprosyn prn. 8/11/21: f/u R ankle. She had SHAHID on 7/26/21. Ambulation with cane 9/29/21 f/u R ankle PT/brace. Spine eval pending 11/10/21: f/u R ankle. Pt/brace. Disability/back issues, considering spine surgery 1/5/22: f/u R ankle. mri results. continued pain. 2/2/22: f/u R ankle. Pre-Op Peeroneal tendon not walking Pain mgt on Tramadol  3/16/22: Right Ankle Peroneal Tendon Repair  3/23/22: f/u R ankle. NWB in splint. Doing ok. 5/4/22 f/u R ankle  HEP/PT  had pinpoint drainge  -f/c  PT  Not working/Disability 8/10/22: f/u R ankle. Pt/hep. Numbness lateral foot 11/2/22: f/u R ankle. hep. Waiting for auth to continue physical therapy. Recently recovering from back surgery. still having pain and numbness.  1/11/23: f/u R ankle. Waiting for auth to continue therapy and for EMG. She states swelling has subsided, but she continues to have pain and numbness.  Still recovering from back surgery on 10/7/22.  2/16/23  f/u R ankle  emg scheduled.  Had swelling after standing prolonged time  Not working 3/30/23  f/u R ankle  back/knee issues  Disabled 5/24/23  f/u R ankle  numbness lateral foot.  Lidoderm patch not authorized  PT auth pending  Not working 8/30/23  f/u R ankle  Using lateral wedge L hip pain too.  Numbness persists lateral r foot 11/30/23  f/u R ankle  HEP, using patches  Numbness persists  Not working 3/27/24: f/u R ankle. HEP. Numbness persists. Not working. 6/5/24  f/u R ankle  HEP  Pain mgt Spinal chord stimulator/Gabapentin/Tramadol  8/28/24  f/u R ankle HEP.  Swelling yesterday lateral ankle being on her feet prolonged for appointments.  Doing HEP. Wearing compression stockings as able. [] : Post Surgical Visit: no [FreeTextEntry1] : R ankle [FreeTextEntry3] : 3/3/2020 [FreeTextEntry5] : 58 Y/O RHD F eval R ankle S/P work injury on above DOI pt states there has been a new onset of swelling and numbness on 2/1/22 due to overuse while at a wedding. 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

## 2024-08-28 NOTE — HISTORY OF PRESENT ILLNESS
[FreeTextEntry1] : Pt presenting today for a f.u visit for fibromyalgia. Last seen 01/2024. Chart reviewed since LV.  Currently feels her symptoms are stable on gabapentin 300mg BID, duloxetine 40 mg daily. Tolerating medications well. Denies side effects. No acute complaints today.  Denies fever, chills, CP, SOB, abd pain, oral ulcers, SICCA symptoms, blood in urine.  No recent falls. ROS otherwise unchanged from .

## 2024-11-06 ENCOUNTER — EMERGENCY (EMERGENCY)
Facility: HOSPITAL | Age: 59
LOS: 1 days | End: 2024-11-06
Attending: EMERGENCY MEDICINE
Payer: COMMERCIAL

## 2024-11-06 ENCOUNTER — APPOINTMENT (OUTPATIENT)
Dept: ORTHOPEDIC SURGERY | Facility: CLINIC | Age: 59
End: 2024-11-06

## 2024-11-06 VITALS
SYSTOLIC BLOOD PRESSURE: 110 MMHG | HEIGHT: 65 IN | TEMPERATURE: 98 F | OXYGEN SATURATION: 98 % | DIASTOLIC BLOOD PRESSURE: 64 MMHG | HEART RATE: 116 BPM | WEIGHT: 216.93 LBS | RESPIRATION RATE: 20 BRPM

## 2024-11-06 VITALS
HEART RATE: 98 BPM | RESPIRATION RATE: 19 BRPM | SYSTOLIC BLOOD PRESSURE: 116 MMHG | TEMPERATURE: 99 F | DIASTOLIC BLOOD PRESSURE: 71 MMHG | OXYGEN SATURATION: 95 %

## 2024-11-06 DIAGNOSIS — Z98.890 OTHER SPECIFIED POSTPROCEDURAL STATES: Chronic | ICD-10-CM

## 2024-11-06 DIAGNOSIS — Z96.642 PRESENCE OF LEFT ARTIFICIAL HIP JOINT: Chronic | ICD-10-CM

## 2024-11-06 DIAGNOSIS — Z90.49 ACQUIRED ABSENCE OF OTHER SPECIFIED PARTS OF DIGESTIVE TRACT: Chronic | ICD-10-CM

## 2024-11-06 DIAGNOSIS — Z98.891 HISTORY OF UTERINE SCAR FROM PREVIOUS SURGERY: Chronic | ICD-10-CM

## 2024-11-06 DIAGNOSIS — Z90.711 ACQUIRED ABSENCE OF UTERUS WITH REMAINING CERVICAL STUMP: Chronic | ICD-10-CM

## 2024-11-06 LAB
ALBUMIN SERPL ELPH-MCNC: 3.8 G/DL — SIGNIFICANT CHANGE UP (ref 3.3–5.2)
ALP SERPL-CCNC: 145 U/L — HIGH (ref 40–120)
ALT FLD-CCNC: 26 U/L — SIGNIFICANT CHANGE UP
ANION GAP SERPL CALC-SCNC: 14 MMOL/L — SIGNIFICANT CHANGE UP (ref 5–17)
APPEARANCE UR: CLEAR — SIGNIFICANT CHANGE UP
AST SERPL-CCNC: 12 U/L — SIGNIFICANT CHANGE UP
BACTERIA # UR AUTO: NEGATIVE /HPF — SIGNIFICANT CHANGE UP
BASOPHILS # BLD AUTO: 0.04 K/UL — SIGNIFICANT CHANGE UP (ref 0–0.2)
BASOPHILS NFR BLD AUTO: 0.2 % — SIGNIFICANT CHANGE UP (ref 0–2)
BILIRUB SERPL-MCNC: 1.3 MG/DL — SIGNIFICANT CHANGE UP (ref 0.4–2)
BILIRUB UR-MCNC: NEGATIVE — SIGNIFICANT CHANGE UP
BUN SERPL-MCNC: 12 MG/DL — SIGNIFICANT CHANGE UP (ref 8–20)
CALCIUM SERPL-MCNC: 8.6 MG/DL — SIGNIFICANT CHANGE UP (ref 8.4–10.5)
CAST: 0 /LPF — SIGNIFICANT CHANGE UP (ref 0–4)
CHLORIDE SERPL-SCNC: 94 MMOL/L — LOW (ref 96–108)
CO2 SERPL-SCNC: 24 MMOL/L — SIGNIFICANT CHANGE UP (ref 22–29)
COLOR SPEC: ABNORMAL
CREAT SERPL-MCNC: 0.65 MG/DL — SIGNIFICANT CHANGE UP (ref 0.5–1.3)
DIFF PNL FLD: ABNORMAL
EGFR: 101 ML/MIN/1.73M2 — SIGNIFICANT CHANGE UP
EOSINOPHIL # BLD AUTO: 0.11 K/UL — SIGNIFICANT CHANGE UP (ref 0–0.5)
EOSINOPHIL NFR BLD AUTO: 0.5 % — SIGNIFICANT CHANGE UP (ref 0–6)
GLUCOSE SERPL-MCNC: 106 MG/DL — HIGH (ref 70–99)
GLUCOSE UR QL: NEGATIVE MG/DL — SIGNIFICANT CHANGE UP
HCT VFR BLD CALC: 38.3 % — SIGNIFICANT CHANGE UP (ref 34.5–45)
HGB BLD-MCNC: 12.7 G/DL — SIGNIFICANT CHANGE UP (ref 11.5–15.5)
IMM GRANULOCYTES NFR BLD AUTO: 0.6 % — SIGNIFICANT CHANGE UP (ref 0–0.9)
KETONES UR-MCNC: NEGATIVE MG/DL — SIGNIFICANT CHANGE UP
LEUKOCYTE ESTERASE UR-ACNC: ABNORMAL
LIDOCAIN IGE QN: 32 U/L — SIGNIFICANT CHANGE UP (ref 22–51)
LYMPHOCYTES # BLD AUTO: 13.3 % — SIGNIFICANT CHANGE UP (ref 13–44)
LYMPHOCYTES # BLD AUTO: 2.69 K/UL — SIGNIFICANT CHANGE UP (ref 1–3.3)
MCHC RBC-ENTMCNC: 30.8 PG — SIGNIFICANT CHANGE UP (ref 27–34)
MCHC RBC-ENTMCNC: 33.2 G/DL — SIGNIFICANT CHANGE UP (ref 32–36)
MCV RBC AUTO: 93 FL — SIGNIFICANT CHANGE UP (ref 80–100)
MONOCYTES # BLD AUTO: 1.45 K/UL — HIGH (ref 0–0.9)
MONOCYTES NFR BLD AUTO: 7.2 % — SIGNIFICANT CHANGE UP (ref 2–14)
NEUTROPHILS # BLD AUTO: 15.73 K/UL — HIGH (ref 1.8–7.4)
NEUTROPHILS NFR BLD AUTO: 78.2 % — HIGH (ref 43–77)
NITRITE UR-MCNC: NEGATIVE — SIGNIFICANT CHANGE UP
PH UR: 6.5 — SIGNIFICANT CHANGE UP (ref 5–8)
PLATELET # BLD AUTO: 492 K/UL — HIGH (ref 150–400)
POTASSIUM SERPL-MCNC: 4.2 MMOL/L — SIGNIFICANT CHANGE UP (ref 3.5–5.3)
POTASSIUM SERPL-SCNC: 4.2 MMOL/L — SIGNIFICANT CHANGE UP (ref 3.5–5.3)
PROT SERPL-MCNC: 6.9 G/DL — SIGNIFICANT CHANGE UP (ref 6.6–8.7)
PROT UR-MCNC: NEGATIVE MG/DL — SIGNIFICANT CHANGE UP
RBC # BLD: 4.12 M/UL — SIGNIFICANT CHANGE UP (ref 3.8–5.2)
RBC # FLD: 13 % — SIGNIFICANT CHANGE UP (ref 10.3–14.5)
RBC CASTS # UR COMP ASSIST: 2 /HPF — SIGNIFICANT CHANGE UP (ref 0–4)
SODIUM SERPL-SCNC: 132 MMOL/L — LOW (ref 135–145)
SP GR SPEC: 1.02 — SIGNIFICANT CHANGE UP (ref 1–1.03)
SQUAMOUS # UR AUTO: 4 /HPF — SIGNIFICANT CHANGE UP (ref 0–5)
UROBILINOGEN FLD QL: 1 MG/DL — SIGNIFICANT CHANGE UP (ref 0.2–1)
WBC # BLD: 20.15 K/UL — HIGH (ref 3.8–10.5)
WBC # FLD AUTO: 20.15 K/UL — HIGH (ref 3.8–10.5)
WBC UR QL: 2 /HPF — SIGNIFICANT CHANGE UP (ref 0–5)

## 2024-11-06 PROCEDURE — 80053 COMPREHEN METABOLIC PANEL: CPT

## 2024-11-06 PROCEDURE — 99285 EMERGENCY DEPT VISIT HI MDM: CPT

## 2024-11-06 PROCEDURE — 83690 ASSAY OF LIPASE: CPT

## 2024-11-06 PROCEDURE — 81001 URINALYSIS AUTO W/SCOPE: CPT

## 2024-11-06 PROCEDURE — 85025 COMPLETE CBC W/AUTO DIFF WBC: CPT

## 2024-11-06 PROCEDURE — 96374 THER/PROPH/DIAG INJ IV PUSH: CPT | Mod: XU

## 2024-11-06 PROCEDURE — 96375 TX/PRO/DX INJ NEW DRUG ADDON: CPT

## 2024-11-06 PROCEDURE — 99284 EMERGENCY DEPT VISIT MOD MDM: CPT | Mod: 25

## 2024-11-06 PROCEDURE — 74177 CT ABD & PELVIS W/CONTRAST: CPT | Mod: MC

## 2024-11-06 PROCEDURE — 71046 X-RAY EXAM CHEST 2 VIEWS: CPT | Mod: 26

## 2024-11-06 PROCEDURE — 71046 X-RAY EXAM CHEST 2 VIEWS: CPT

## 2024-11-06 PROCEDURE — 74177 CT ABD & PELVIS W/CONTRAST: CPT | Mod: 26,MC

## 2024-11-06 PROCEDURE — 99053 MED SERV 10PM-8AM 24 HR FAC: CPT

## 2024-11-06 PROCEDURE — 36415 COLL VENOUS BLD VENIPUNCTURE: CPT

## 2024-11-06 RX ORDER — ONDANSETRON HYDROCHLORIDE 2 MG/ML
4 INJECTION, SOLUTION INTRAMUSCULAR; INTRAVENOUS ONCE
Refills: 0 | Status: COMPLETED | OUTPATIENT
Start: 2024-11-06 | End: 2024-11-06

## 2024-11-06 RX ORDER — AMOXICILLIN AND CLAVULANATE POTASSIUM 600; 42.9 MG/5ML; MG/5ML
1 POWDER, FOR SUSPENSION ORAL
Qty: 30 | Refills: 0
Start: 2024-11-06 | End: 2024-11-15

## 2024-11-06 RX ORDER — SODIUM CHLORIDE 9 MG/ML
1000 INJECTION, SOLUTION INTRAMUSCULAR; INTRAVENOUS; SUBCUTANEOUS ONCE
Refills: 0 | Status: COMPLETED | OUTPATIENT
Start: 2024-11-06 | End: 2024-11-06

## 2024-11-06 RX ORDER — AMPICILLIN AND SULBACTAM 2; 1 G/1; G/1
3 INJECTION, POWDER, FOR SOLUTION INTRAMUSCULAR; INTRAVENOUS ONCE
Refills: 0 | Status: COMPLETED | OUTPATIENT
Start: 2024-11-06 | End: 2024-11-06

## 2024-11-06 RX ORDER — MORPHINE SULFATE 30 MG/1
4 TABLET, EXTENDED RELEASE ORAL ONCE
Refills: 0 | Status: DISCONTINUED | OUTPATIENT
Start: 2024-11-06 | End: 2024-11-06

## 2024-11-06 RX ADMIN — ONDANSETRON HYDROCHLORIDE 4 MILLIGRAM(S): 2 INJECTION, SOLUTION INTRAMUSCULAR; INTRAVENOUS at 08:30

## 2024-11-06 RX ADMIN — MORPHINE SULFATE 4 MILLIGRAM(S): 30 TABLET, EXTENDED RELEASE ORAL at 09:00

## 2024-11-06 RX ADMIN — SODIUM CHLORIDE 1000 MILLILITER(S): 9 INJECTION, SOLUTION INTRAMUSCULAR; INTRAVENOUS; SUBCUTANEOUS at 08:30

## 2024-11-06 RX ADMIN — AMPICILLIN AND SULBACTAM 200 GRAM(S): 2; 1 INJECTION, POWDER, FOR SOLUTION INTRAMUSCULAR; INTRAVENOUS at 12:28

## 2024-11-06 RX ADMIN — MORPHINE SULFATE 4 MILLIGRAM(S): 30 TABLET, EXTENDED RELEASE ORAL at 08:30

## 2024-11-06 NOTE — ED ADULT NURSE NOTE - OBJECTIVE STATEMENT
Pt A+Ox4 c/o lower abdominal pain since yesterday, Pt states that she had surgery on her right knee on October 21st, and that she is worried that she is constipated due to the pain medication. Pt states that she has been taking percocet for the pain. Pt states that she took milk of magnesium last night, and had lose stool around 3am. Pt states that she vomited 5x since yesterday. Pt states + HA. Pt denies SOB, CP, dizziness, fevers, chills, or body aches. Pt states +ABD tenderness and +distension.

## 2024-11-06 NOTE — ED ADULT NURSE NOTE - NSFALLHARMRISKINTERV_ED_ALL_ED

## 2024-11-06 NOTE — ED ADULT NURSE NOTE - NSFALLRISKFACTORS_ED_ALL_ED
right knee sx October/Surgery: Recent surgery, recent lower limb amputation, major abdominal or thoracic surgery right knee sx October/Coagulation: Bleeding disorder either through use of anticoagulants or underlying clinical condition(s)/Surgery: Recent surgery, recent lower limb amputation, major abdominal or thoracic surgery

## 2024-11-06 NOTE — ED PROVIDER NOTE - NSFOLLOWUPINSTRUCTIONS_ED_ALL_ED_FT
augmentin 875mg by mouth 3 times a day for 10 days  tylenol or motrin for pain  increase fluid intake  return to ed with worsening symptoms of vomiting, abdominal pain or diarrhea

## 2024-11-06 NOTE — ED PROVIDER NOTE - OBJECTIVE STATEMENT
59-year-old female past medical history of cholecystectomy C-sections recent knee surgery on Percocet comes to the ED with 1 day history of abdominal pain nausea vomiting and diarrhea.  Patient denies any sick contacts.  Patient noted having a bowel movement yesterday.

## 2024-11-06 NOTE — ED ADULT NURSE REASSESSMENT NOTE - NS ED NURSE REASSESS COMMENT FT1
Pt A+Ox4, respirations are equal and unlabored on room air. Pt discharged at this time, IV removed, vital signs recorded, discharge paperwork provided to pt.

## 2024-11-06 NOTE — ED PROVIDER NOTE - CLINICAL SUMMARY MEDICAL DECISION MAKING FREE TEXT BOX
5-year-old female past medical history of cholecystectomy C-sections with abdominal pain vomiting diarrhea evaluate for obstruction gastroenteritis versus pancreatitis.  Will obtain lab work urinalysis

## 2024-11-06 NOTE — ED PROVIDER NOTE - PATIENT PORTAL LINK FT
You can access the FollowMyHealth Patient Portal offered by [facility] by registering at the following website: [email or web address]. By joining [employer]’s FollowMyHealth portal, you will also be able to view your health information using other applications (apps) compatible with our system.

## 2024-11-06 NOTE — ED ADULT TRIAGE NOTE - CHIEF COMPLAINT QUOTE
pt c/o lower abdominal pain since yesterday, patient had surgery on her knee on October 21st, worried that she is constipated, vomiting x5 since yesterday.

## 2024-12-11 ENCOUNTER — APPOINTMENT (OUTPATIENT)
Dept: ORTHOPEDIC SURGERY | Facility: CLINIC | Age: 59
End: 2024-12-11
Payer: OTHER MISCELLANEOUS

## 2024-12-11 VITALS — HEIGHT: 65 IN | WEIGHT: 190 LBS | BODY MASS INDEX: 31.65 KG/M2

## 2024-12-11 DIAGNOSIS — S86.301D UNSPECIFIED INJURY OF MUSCLE(S) AND TENDON(S) OF PERONEAL MUSCLE GROUP AT LOWER LEG LEVEL, RIGHT LEG, SUBSEQUENT ENCOUNTER: ICD-10-CM

## 2024-12-11 DIAGNOSIS — G57.31 LESION OF LATERAL POPLITEAL NERVE, RIGHT LOWER LIMB: ICD-10-CM

## 2024-12-11 DIAGNOSIS — R60.0 LOCALIZED EDEMA: ICD-10-CM

## 2024-12-11 PROCEDURE — 99214 OFFICE O/P EST MOD 30 MIN: CPT

## 2025-01-15 ENCOUNTER — APPOINTMENT (OUTPATIENT)
Dept: RHEUMATOLOGY | Facility: CLINIC | Age: 60
End: 2025-01-15
Payer: MEDICARE

## 2025-01-15 ENCOUNTER — NON-APPOINTMENT (OUTPATIENT)
Age: 60
End: 2025-01-15

## 2025-01-15 VITALS
DIASTOLIC BLOOD PRESSURE: 88 MMHG | HEART RATE: 86 BPM | TEMPERATURE: 98 F | SYSTOLIC BLOOD PRESSURE: 124 MMHG | WEIGHT: 210 LBS | OXYGEN SATURATION: 98 % | HEIGHT: 65 IN | RESPIRATION RATE: 17 BRPM | BODY MASS INDEX: 34.99 KG/M2

## 2025-01-15 DIAGNOSIS — R53.82 CHRONIC FATIGUE, UNSPECIFIED: ICD-10-CM

## 2025-01-15 DIAGNOSIS — R21 RASH AND OTHER NONSPECIFIC SKIN ERUPTION: ICD-10-CM

## 2025-01-15 DIAGNOSIS — M25.50 PAIN IN UNSPECIFIED JOINT: ICD-10-CM

## 2025-01-15 DIAGNOSIS — M79.10 MYALGIA, UNSPECIFIED SITE: ICD-10-CM

## 2025-01-15 DIAGNOSIS — M79.7 FIBROMYALGIA: ICD-10-CM

## 2025-01-15 DIAGNOSIS — M54.50 LOW BACK PAIN, UNSPECIFIED: ICD-10-CM

## 2025-01-15 PROCEDURE — 99214 OFFICE O/P EST MOD 30 MIN: CPT

## 2025-01-15 PROCEDURE — G2211 COMPLEX E/M VISIT ADD ON: CPT

## 2025-03-19 ENCOUNTER — APPOINTMENT (OUTPATIENT)
Dept: ORTHOPEDIC SURGERY | Facility: CLINIC | Age: 60
End: 2025-03-19
Payer: OTHER MISCELLANEOUS

## 2025-03-19 VITALS — HEIGHT: 65 IN | BODY MASS INDEX: 34.99 KG/M2 | WEIGHT: 210 LBS

## 2025-03-19 DIAGNOSIS — R20.0 ANESTHESIA OF SKIN: ICD-10-CM

## 2025-03-19 DIAGNOSIS — R20.2 ANESTHESIA OF SKIN: ICD-10-CM

## 2025-03-19 DIAGNOSIS — M79.2 NEURALGIA AND NEURITIS, UNSPECIFIED: ICD-10-CM

## 2025-03-19 DIAGNOSIS — S86.301D UNSPECIFIED INJURY OF MUSCLE(S) AND TENDON(S) OF PERONEAL MUSCLE GROUP AT LOWER LEG LEVEL, RIGHT LEG, SUBSEQUENT ENCOUNTER: ICD-10-CM

## 2025-03-19 PROCEDURE — 99213 OFFICE O/P EST LOW 20 MIN: CPT

## 2025-05-14 ENCOUNTER — APPOINTMENT (OUTPATIENT)
Dept: RHEUMATOLOGY | Facility: CLINIC | Age: 60
End: 2025-05-14

## 2025-05-14 VITALS
DIASTOLIC BLOOD PRESSURE: 100 MMHG | OXYGEN SATURATION: 95 % | BODY MASS INDEX: 35.32 KG/M2 | RESPIRATION RATE: 17 BRPM | HEART RATE: 94 BPM | WEIGHT: 212 LBS | HEIGHT: 65 IN | TEMPERATURE: 98 F | SYSTOLIC BLOOD PRESSURE: 158 MMHG

## 2025-05-14 DIAGNOSIS — R53.82 CHRONIC FATIGUE, UNSPECIFIED: ICD-10-CM

## 2025-05-14 DIAGNOSIS — M79.7 FIBROMYALGIA: ICD-10-CM

## 2025-05-14 DIAGNOSIS — M79.10 MYALGIA, UNSPECIFIED SITE: ICD-10-CM

## 2025-05-14 DIAGNOSIS — M25.50 PAIN IN UNSPECIFIED JOINT: ICD-10-CM

## 2025-05-14 PROCEDURE — G2211 COMPLEX E/M VISIT ADD ON: CPT

## 2025-05-14 PROCEDURE — 99214 OFFICE O/P EST MOD 30 MIN: CPT

## 2025-06-18 ENCOUNTER — APPOINTMENT (OUTPATIENT)
Dept: ORTHOPEDIC SURGERY | Facility: CLINIC | Age: 60
End: 2025-06-18
Payer: OTHER MISCELLANEOUS

## 2025-06-18 VITALS — WEIGHT: 212 LBS | BODY MASS INDEX: 35.32 KG/M2 | HEIGHT: 65 IN

## 2025-06-18 PROCEDURE — 99213 OFFICE O/P EST LOW 20 MIN: CPT

## 2025-09-24 PROBLEM — S86.301S: Status: ACTIVE | Noted: 2025-09-24

## (undated) DEVICE — POSITIONER FOAM EGG CRATE ULNAR (2PCS)

## (undated) DEVICE — SUT VICRYL 2-0 18" CP-2 UNDYED

## (undated) DEVICE — DRAIN JACKSON PRATT 15FR ROUND END NO TROCAR

## (undated) DEVICE — DRAPE SPLIT SHEETS 77X108"

## (undated) DEVICE — DRAPE TOWEL BLUE 17" X 24"

## (undated) DEVICE — SUT VICRYL 0 18" CT-1 UNDYED

## (undated) DEVICE — ELCTR EDGE BOVIE INSULATED NEEDLE TIP 6.5"

## (undated) DEVICE — FOLEY TRAY 16FR LF URINE METER SURESTEP

## (undated) DEVICE — SUT DERMABOND 0.7ML

## (undated) DEVICE — DRSG TELFA 3 X 4

## (undated) DEVICE — BLANKET WARMER UPPER ADULT

## (undated) DEVICE — DRAPE MICROSCOPE LEICA  26X150"

## (undated) DEVICE — DRAPE TOWEL BLUE STICKY

## (undated) DEVICE — PACK NEURO SO SIDE

## (undated) DEVICE — DRAPE SHEET XL 77X98"

## (undated) DEVICE — SUT MONOCRYL 4-0 27" PS-2 UNDYED

## (undated) DEVICE — DRAPE INSTRUMENT POUCH

## (undated) DEVICE — Device

## (undated) DEVICE — DRILL BIT MEDTRONIC 5.0MM PLATINIUM

## (undated) DEVICE — BLANKET WARMER LOWER ADULT

## (undated) DEVICE — DRAIN JACKSON PRATT 7MM FLAT FULL W 15 FR TROCAR

## (undated) DEVICE — WRAP COMPRESSION CALF MED

## (undated) DEVICE — POSITIONER OA ARM ELEVATOR DISP

## (undated) DEVICE — DRAPE C ARM UNIVERSAL

## (undated) DEVICE — FORCEP BIPOLAR 7.75X1.5MM 10/BX